# Patient Record
Sex: FEMALE | Race: BLACK OR AFRICAN AMERICAN | Employment: UNEMPLOYED | URBAN - METROPOLITAN AREA
[De-identification: names, ages, dates, MRNs, and addresses within clinical notes are randomized per-mention and may not be internally consistent; named-entity substitution may affect disease eponyms.]

---

## 2018-01-19 ENCOUNTER — HOSPITAL ENCOUNTER (EMERGENCY)
Age: 63
Discharge: HOME | End: 2018-01-19
Attending: EMERGENCY MEDICINE
Payer: SELF-PAY

## 2018-01-19 VITALS
BODY MASS INDEX: 15.36 KG/M2 | WEIGHT: 90 LBS | RESPIRATION RATE: 15 BRPM | SYSTOLIC BLOOD PRESSURE: 104 MMHG | HEIGHT: 64 IN | TEMPERATURE: 97.8 F | DIASTOLIC BLOOD PRESSURE: 43 MMHG | HEART RATE: 79 BPM | OXYGEN SATURATION: 100 %

## 2018-01-19 DIAGNOSIS — M62.838 MUSCLE SPASM: Primary | ICD-10-CM

## 2018-01-19 LAB
ALBUMIN SERPL-MCNC: 4.2 G/DL (ref 3.4–5)
ALBUMIN/GLOB SERPL: 1 {RATIO} (ref 0.8–1.7)
ALP SERPL-CCNC: 80 U/L (ref 45–117)
ALT SERPL-CCNC: 18 U/L (ref 13–56)
ANION GAP SERPL CALC-SCNC: 13 MMOL/L (ref 3–18)
AST SERPL-CCNC: 20 U/L (ref 15–37)
BASOPHILS # BLD: 0 K/UL (ref 0–0.06)
BASOPHILS NFR BLD: 0 % (ref 0–2)
BILIRUB SERPL-MCNC: 1.1 MG/DL (ref 0.2–1)
BUN SERPL-MCNC: 31 MG/DL (ref 7–18)
BUN/CREAT SERPL: 20 (ref 12–20)
CALCIUM SERPL-MCNC: 10 MG/DL (ref 8.5–10.1)
CHLORIDE SERPL-SCNC: 95 MMOL/L (ref 100–108)
CK SERPL-CCNC: 41 U/L (ref 26–192)
CO2 SERPL-SCNC: 29 MMOL/L (ref 21–32)
CREAT SERPL-MCNC: 1.58 MG/DL (ref 0.6–1.3)
DIFFERENTIAL METHOD BLD: ABNORMAL
EOSINOPHIL # BLD: 0 K/UL (ref 0–0.4)
EOSINOPHIL NFR BLD: 0 % (ref 0–5)
ERYTHROCYTE [DISTWIDTH] IN BLOOD BY AUTOMATED COUNT: 14.8 % (ref 11.6–14.5)
GLOBULIN SER CALC-MCNC: 4.4 G/DL (ref 2–4)
GLUCOSE SERPL-MCNC: 138 MG/DL (ref 74–99)
HCT VFR BLD AUTO: 40.4 % (ref 35–45)
HGB BLD-MCNC: 12.8 G/DL (ref 12–16)
LYMPHOCYTES # BLD: 1.7 K/UL (ref 0.9–3.6)
LYMPHOCYTES NFR BLD: 30 % (ref 21–52)
MAGNESIUM SERPL-MCNC: 2.5 MG/DL (ref 1.6–2.6)
MCH RBC QN AUTO: 25 PG (ref 24–34)
MCHC RBC AUTO-ENTMCNC: 31.7 G/DL (ref 31–37)
MCV RBC AUTO: 78.9 FL (ref 74–97)
MONOCYTES # BLD: 0.4 K/UL (ref 0.05–1.2)
MONOCYTES NFR BLD: 7 % (ref 3–10)
NEUTS SEG # BLD: 3.5 K/UL (ref 1.8–8)
NEUTS SEG NFR BLD: 63 % (ref 40–73)
PLATELET # BLD AUTO: 249 K/UL (ref 135–420)
PMV BLD AUTO: 10.6 FL (ref 9.2–11.8)
POTASSIUM SERPL-SCNC: 4.6 MMOL/L (ref 3.5–5.5)
PROT SERPL-MCNC: 8.6 G/DL (ref 6.4–8.2)
RBC # BLD AUTO: 5.12 M/UL (ref 4.2–5.3)
SODIUM SERPL-SCNC: 137 MMOL/L (ref 136–145)
WBC # BLD AUTO: 5.6 K/UL (ref 4.6–13.2)

## 2018-01-19 PROCEDURE — 96360 HYDRATION IV INFUSION INIT: CPT

## 2018-01-19 PROCEDURE — 83735 ASSAY OF MAGNESIUM: CPT | Performed by: EMERGENCY MEDICINE

## 2018-01-19 PROCEDURE — 85025 COMPLETE CBC W/AUTO DIFF WBC: CPT | Performed by: EMERGENCY MEDICINE

## 2018-01-19 PROCEDURE — 99284 EMERGENCY DEPT VISIT MOD MDM: CPT

## 2018-01-19 PROCEDURE — 74011250637 HC RX REV CODE- 250/637: Performed by: EMERGENCY MEDICINE

## 2018-01-19 PROCEDURE — 80053 COMPREHEN METABOLIC PANEL: CPT | Performed by: EMERGENCY MEDICINE

## 2018-01-19 PROCEDURE — 74011250636 HC RX REV CODE- 250/636: Performed by: EMERGENCY MEDICINE

## 2018-01-19 PROCEDURE — 82550 ASSAY OF CK (CPK): CPT | Performed by: EMERGENCY MEDICINE

## 2018-01-19 RX ORDER — OXYCODONE HYDROCHLORIDE 10 MG/1
TABLET ORAL
COMMUNITY
End: 2019-11-19

## 2018-01-19 RX ORDER — DIAZEPAM 5 MG/1
2.5 TABLET ORAL ONCE
Status: COMPLETED | OUTPATIENT
Start: 2018-01-19 | End: 2018-01-19

## 2018-01-19 RX ORDER — DIAZEPAM 2 MG/1
2 TABLET ORAL
Qty: 20 TAB | Refills: 0 | Status: SHIPPED | OUTPATIENT
Start: 2018-01-19 | End: 2019-11-19

## 2018-01-19 RX ADMIN — SODIUM CHLORIDE 1000 ML: 900 INJECTION, SOLUTION INTRAVENOUS at 13:31

## 2018-01-19 RX ADMIN — DIAZEPAM 2.5 MG: 5 TABLET ORAL at 12:22

## 2018-01-19 NOTE — DISCHARGE INSTRUCTIONS
Muscle Cramps: Care Instructions  Your Care Instructions    A muscle cramp occurs when a muscle tightens up suddenly. A cramp often happens in the legs. A muscle cramp is also called a muscle spasm or a charley horse. Muscle cramps usually last less than a minute. However, the pain may last for several minutes. Leg cramps that occur at night may wake you up. Heavy exercise, dehydration, and being overweight can increase your risk of getting cramps. An imbalance of certain chemicals in your blood, called electrolytes, can also lead to muscle cramps. Pregnant women sometimes get muscle cramps during sleep. Muscle cramps can be treated by stretching and massaging the muscle. If cramps keep coming back, your doctor may prescribe medicine that relaxes your muscles. Follow-up care is a key part of your treatment and safety. Be sure to make and go to all appointments, and call your doctor if you are having problems. It's also a good idea to know your test results and keep a list of the medicines you take. How can you care for yourself at home? · Drink plenty of fluids to prevent dehydration. Choose water and other caffeine-free clear liquids until you feel better. If you have kidney, heart, or liver disease and have to limit fluids, talk with your doctor before you increase the amount of fluids you drink. · Stretch your muscles every day, especially before and after exercise and at bedtime. Regular stretching can relax your muscles and may prevent cramps. · Do not suddenly increase the amount of exercise you get. Increase your exercise a little each week. · When you get a cramp, stretch and massage the muscle. You can also take a warm shower or bath to relax the muscle. A heating pad placed on the muscle can also help. · Take a daily multivitamin supplement. · Ask your doctor if you can take an over-the-counter pain medicine, such as acetaminophen (Tylenol), ibuprofen (Advil, Motrin), or naproxen (Aleve). Be safe with medicines. Read and follow all instructions on the label. When should you call for help? Watch closely for changes in your health, and be sure to contact your doctor if:  ? · You get muscle cramps often that do not go away after home treatment. ? · Your muscle cramps often wake you up at night. ? · You do not get better as expected. Where can you learn more? Go to http://shirley-jai.info/. Enter H270 in the search box to learn more about \"Muscle Cramps: Care Instructions. \"  Current as of: March 21, 2017  Content Version: 11.4  © 3704-6167 [employer]. Care instructions adapted under license by "[employer]" (which disclaims liability or warranty for this information). If you have questions about a medical condition or this instruction, always ask your healthcare professional. [address] any warranty or liability for your use of this information.

## 2018-01-19 NOTE — ED TRIAGE NOTES
Pt started first week of chemotherapy tues wed thurs at [facility] for colon cancer;  Started having cramping in lower legs, thighs, hands 2 days ago, today is worse;  Pt denies nausea, vomiting, diarrhea, fever;

## 2018-01-19 NOTE — ED PROVIDER NOTES
EMERGENCY DEPARTMENT HISTORY AND PHYSICAL EXAM    Date: 1/19/2018  Patient Name: Sofy Eli    History of Presenting Illness     Chief Complaint   Patient presents with    Spasms         History Provided By: Patient    Chief Complaint: Tremors/Muscle cramping  Duration: 3-4 Hours  Timing:  Acute  Location: General body  Quality: Cramping  Severity: 4 out of 10  Associated Symptoms: nausea, decreased appetite, and weight loss    Additional History (Context):   12:10 PM  Sofy Eli is a 63 y.o. female with PMHx of colon cancer who presents to the emergency department C/O general body tremors/muscle cramping (4/10) onset 3-4 hours ago. Associated sxs include nausea, decreased appetite, and weight loss. Reports she is in chemotherapy and taking pain medications. Her last chemotherapy treatment was yesterday for colon cancer. Pt's oncologist is Dr. Jordon Tejada. Yenny Garcia MD at [facility]. Denies any new medications. Pt denies vomiting, diarrhea, constipation, fever, abdominal pain, chills, and any other sxs or complaints. PCP: None        Past History     Past Medical History:  Past Medical History:   Diagnosis Date    Cancer [facility])     colon cancer       Past Surgical History:  History reviewed. No pertinent surgical history. Family History:  History reviewed. No pertinent family history. Social History:  Social History   Substance Use Topics    Smoking status: None    Smokeless tobacco: None    Alcohol use None       Allergies:  No Known Allergies      Review of Systems   Review of Systems   Constitutional: Negative for fever. Gastrointestinal: Positive for nausea. Negative for abdominal pain, constipation, diarrhea and vomiting. Musculoskeletal: Positive for myalgias (general cramping). Neurological: Positive for tremors. All other systems reviewed and are negative.       Physical Exam     Vitals:    01/19/18 1300 01/19/18 1315 01/19/18 1330 01/19/18 1345   BP: 104/65 109/61 111/56 104/43   Pulse: 76 73 80 79   Resp: 12 14 20 15   Temp:       SpO2: 100% 98% 96% 100%   Weight:       Height:         Physical Exam   Nursing note and vitals reviewed. Constitutional: Chronically ill-appearing, no acute distress, cachectic, diffuse intermittent body spasms  Head: Normocephalic, Atraumatic  Eyes: Pupils are equal, round, and reactive to light, EOMI  Neck: Supple  Cardiovascular: Regular rate and rhythm, no murmurs, rubs, or gallops  Chest: Normal work of breathing and chest excursion bilaterally  Lungs: Clear to ausculation bilaterally  Abdomen: Soft, non tender, non distended, normoactive bowel sounds  Back: No evidence of trauma or deformity  Extremities: No evidence of trauma or deformity, no LE edema  Skin: Warm and dry, normal cap refill  Neuro: Alert and appropriate, CN intact, normal speech, strength and sensation full and symmetric bilaterally, normal gait, normal coordination  Psychiatric: Normal mood and affect    Diagnostic Study Results     Labs -     Recent Results (from the past 12 hour(s))   CBC WITH AUTOMATED DIFF    Collection Time: 01/19/18 12:45 PM   Result Value Ref Range    WBC 5.6 4.6 - 13.2 K/uL    RBC 5.12 4.20 - 5.30 M/uL    HGB 12.8 12.0 - 16.0 g/dL    HCT 40.4 35.0 - 45.0 %    MCV 78.9 74.0 - 97.0 FL    MCH 25.0 24.0 - 34.0 PG    MCHC 31.7 31.0 - 37.0 g/dL    RDW 14.8 (H) 11.6 - 14.5 %    PLATELET 965 629 - 346 K/uL    MPV 10.6 9.2 - 11.8 FL    NEUTROPHILS 63 40 - 73 %    LYMPHOCYTES 30 21 - 52 %    MONOCYTES 7 3 - 10 %    EOSINOPHILS 0 0 - 5 %    BASOPHILS 0 0 - 2 %    ABS. NEUTROPHILS 3.5 1.8 - 8.0 K/UL    ABS. LYMPHOCYTES 1.7 0.9 - 3.6 K/UL    ABS. MONOCYTES 0.4 0.05 - 1.2 K/UL    ABS. EOSINOPHILS 0.0 0.0 - 0.4 K/UL    ABS.  BASOPHILS 0.0 0.0 - 0.06 K/UL    DF AUTOMATED     METABOLIC PANEL, COMPREHENSIVE    Collection Time: 01/19/18 12:45 PM   Result Value Ref Range    Sodium 137 136 - 145 mmol/L    Potassium 4.6 3.5 - 5.5 mmol/L    Chloride 95 (L) 100 - 108 mmol/L    CO2 29 21 - 32 mmol/L    Anion gap 13 3.0 - 18 mmol/L    Glucose 138 (H) 74 - 99 mg/dL    BUN 31 (H) 7.0 - 18 MG/DL    Creatinine 1.58 (H) 0.6 - 1.3 MG/DL    BUN/Creatinine ratio 20 12 - 20      GFR est AA 40 (L) >60 ml/min/1.73m2    GFR est non-AA 33 (L) >60 ml/min/1.73m2    Calcium 10.0 8.5 - 10.1 MG/DL    Bilirubin, total 1.1 (H) 0.2 - 1.0 MG/DL    ALT (SGPT) 18 13 - 56 U/L    AST (SGOT) 20 15 - 37 U/L    Alk. phosphatase 80 45 - 117 U/L    Protein, total 8.6 (H) 6.4 - 8.2 g/dL    Albumin 4.2 3.4 - 5.0 g/dL    Globulin 4.4 (H) 2.0 - 4.0 g/dL    A-G Ratio 1.0 0.8 - 1.7     MAGNESIUM    Collection Time: 01/19/18 12:45 PM   Result Value Ref Range    Magnesium 2.5 1.6 - 2.6 mg/dL   CK    Collection Time: 01/19/18 12:45 PM   Result Value Ref Range    CK 41 26 - 192 U/L       Radiologic Studies -   No orders to display     CT Results  (Last 48 hours)    None        CXR Results  (Last 48 hours)    None          Medications given in the ED-  Medications   diazePAM (VALIUM) tablet 2.5 mg (2.5 mg Oral Given 1/19/18 1222)   sodium chloride 0.9 % bolus infusion 1,000 mL (0 mL IntraVENous IV Completed 1/19/18 1727)         Medical Decision Making   I am the first provider for this patient. I reviewed the vital signs, available nursing notes, past medical history, past surgical history, family history and social history. Vital Signs-Reviewed the patient's vital signs. Pulse Oximetry Analysis - 100% on RA. Cardiac Monitor:  Rate: 91 bpm  Rhythm: NSR    Records Reviewed: Nursing Notes and Old Medical Records    Procedures:  Procedures    ED Course:   12:10 PM Initial assessment performed. The patients presenting problems have been discussed, and they are in agreement with the care plan formulated and outlined with them. I have encouraged them to ask questions as they arise throughout their visit.     PROGRESS NOTE:   1:31 PM  Pt has been re-examined by Gogo Colón MD. Pt reports her symptoms have completely resolved. CONSULT NOTE:   2:43 PM  Gloria Yoo MD spoke with Jo Richey's Nurse   Specialty: Oncology  Discussed pt's hx, disposition, and available diagnostic and imaging results over the telephone. Reviewed care plans. Consulting physician agrees with starting Valium and will see patient next week in his office. Diagnosis and Disposition     Discussion:  63 y.o female presenting for diffuse muscle spasms. Other than slight dehydration, labs are benign. Improved here with small dose of Valium. Provided with IV hydration. Discussed with pt's oncologist. Plan for discharge with short course of Valium for symptom management, early oncology follow up and return precautions. Pt understands and agrees with this plan. DISCHARGE NOTE:  2:45 PM  Daya Parks's  results have been reviewed with her. She has been counseled regarding her diagnosis, treatment, and plan. She verbally conveys understanding and agreement of the signs, symptoms, diagnosis, treatment and prognosis and additionally agrees to follow up as discussed. She also agrees with the care-plan and conveys that all of her questions have been answered. I have also provided discharge instructions for her that include: educational information regarding their diagnosis and treatment, and list of reasons why they would want to return to the ED prior to their follow-up appointment, should her condition change. She has been provided with education for proper emergency department utilization. CLINICAL IMPRESSION:    1. Muscle spasm        PLAN:  1. D/C Home  2. Current Discharge Medication List      START taking these medications    Details   diazePAM (VALIUM) 2 mg tablet Take 1 Tab by mouth every six (6) hours as needed (muscle spasm). Max Daily Amount: 8 mg. Qty: 20 Tab, Refills: 0    Associated Diagnoses: Muscle spasm           3.    Follow-up Information     Follow up With Details Comments 0792 Raúl Rasmussen MD Schedule an appointment as soon as possible for a visit in 3 days For primary care follow up. [id number] [address] [address]  [phone]      [facility] EMERGENCY DEPT Go to As needed, If symptoms worsen [address] [address]  [phone]        _______________________________    Attestations: This note is prepared by Nabil Scott, acting as Scribe for Deidre Rinaldi MD.    Deidre Rinaldi MD:  The scribe's documentation has been prepared under my direction and personally reviewed by me in its entirety.   I confirm that the note above accurately reflects all work, treatment, procedures, and medical decision making performed by me.  _______________________________

## 2019-11-16 ENCOUNTER — HOSPITAL ENCOUNTER (INPATIENT)
Age: 64
LOS: 3 days | Discharge: HOSPICE | DRG: 240 | End: 2019-11-19
Attending: EMERGENCY MEDICINE | Admitting: FAMILY MEDICINE
Payer: MEDICAID

## 2019-11-16 ENCOUNTER — APPOINTMENT (OUTPATIENT)
Dept: CT IMAGING | Age: 64
DRG: 240 | End: 2019-11-16
Attending: EMERGENCY MEDICINE
Payer: MEDICAID

## 2019-11-16 DIAGNOSIS — Z85.038 H/O COLON CANCER, STAGE IV: Primary | ICD-10-CM

## 2019-11-16 DIAGNOSIS — D50.0 IRON DEFICIENCY ANEMIA DUE TO CHRONIC BLOOD LOSS: ICD-10-CM

## 2019-11-16 DIAGNOSIS — C18.9 MALIGNANT NEOPLASM OF COLON, UNSPECIFIED PART OF COLON (HCC): ICD-10-CM

## 2019-11-16 DIAGNOSIS — R53.81 DEBILITY: ICD-10-CM

## 2019-11-16 DIAGNOSIS — K62.89 RECTAL PAIN: ICD-10-CM

## 2019-11-16 DIAGNOSIS — R53.1 WEAKNESS: ICD-10-CM

## 2019-11-16 DIAGNOSIS — C18.2 MALIGNANT NEOPLASM OF ASCENDING COLON (HCC): ICD-10-CM

## 2019-11-16 DIAGNOSIS — Z71.89 ADVANCED CARE PLANNING/COUNSELING DISCUSSION: ICD-10-CM

## 2019-11-16 LAB
ABO + RH BLD: NORMAL
ALBUMIN SERPL-MCNC: 2.7 G/DL (ref 3.4–5)
ALBUMIN/GLOB SERPL: 0.6 {RATIO} (ref 0.8–1.7)
ALP SERPL-CCNC: 237 U/L (ref 45–117)
ALT SERPL-CCNC: 14 U/L (ref 13–56)
ANION GAP SERPL CALC-SCNC: 10 MMOL/L (ref 3–18)
APPEARANCE UR: CLEAR
AST SERPL-CCNC: 150 U/L (ref 10–38)
BACTERIA URNS QL MICRO: ABNORMAL /HPF
BASOPHILS # BLD: 0 K/UL (ref 0–0.1)
BASOPHILS NFR BLD: 0 % (ref 0–2)
BILIRUB SERPL-MCNC: 0.5 MG/DL (ref 0.2–1)
BILIRUB UR QL: NEGATIVE
BLOOD GROUP ANTIBODIES SERPL: NORMAL
BUN SERPL-MCNC: 13 MG/DL (ref 7–18)
BUN/CREAT SERPL: 13 (ref 12–20)
CALCIUM SERPL-MCNC: 8.3 MG/DL (ref 8.5–10.1)
CHLORIDE SERPL-SCNC: 97 MMOL/L (ref 100–111)
CK MB CFR SERPL CALC: NORMAL % (ref 0–4)
CK MB SERPL-MCNC: <1 NG/ML (ref 5–25)
CK SERPL-CCNC: 174 U/L (ref 26–192)
CO2 SERPL-SCNC: 28 MMOL/L (ref 21–32)
COLOR UR: YELLOW
CREAT SERPL-MCNC: 1.03 MG/DL (ref 0.6–1.3)
DIFFERENTIAL METHOD BLD: ABNORMAL
EOSINOPHIL # BLD: 0 K/UL (ref 0–0.4)
EOSINOPHIL NFR BLD: 0 % (ref 0–5)
EPITH CASTS URNS QL MICRO: ABNORMAL /LPF (ref 0–5)
ERYTHROCYTE [DISTWIDTH] IN BLOOD BY AUTOMATED COUNT: 19.1 % (ref 11.6–14.5)
GLOBULIN SER CALC-MCNC: 4.6 G/DL (ref 2–4)
GLUCOSE SERPL-MCNC: 94 MG/DL (ref 74–99)
GLUCOSE UR STRIP.AUTO-MCNC: NEGATIVE MG/DL
HCT VFR BLD AUTO: 29.8 % (ref 35–45)
HGB BLD-MCNC: 9.2 G/DL (ref 12–16)
HGB UR QL STRIP: ABNORMAL
KETONES UR QL STRIP.AUTO: NEGATIVE MG/DL
LEUKOCYTE ESTERASE UR QL STRIP.AUTO: NEGATIVE
LIPASE SERPL-CCNC: 156 U/L (ref 73–393)
LYMPHOCYTES # BLD: 0.6 K/UL (ref 0.9–3.6)
LYMPHOCYTES NFR BLD: 5 % (ref 21–52)
MAGNESIUM SERPL-MCNC: 1.9 MG/DL (ref 1.6–2.6)
MCH RBC QN AUTO: 24.5 PG (ref 24–34)
MCHC RBC AUTO-ENTMCNC: 30.9 G/DL (ref 31–37)
MCV RBC AUTO: 79.3 FL (ref 74–97)
MONOCYTES # BLD: 1.3 K/UL (ref 0.05–1.2)
MONOCYTES NFR BLD: 11 % (ref 3–10)
NEUTS SEG # BLD: 9.7 K/UL (ref 1.8–8)
NEUTS SEG NFR BLD: 84 % (ref 40–73)
NITRITE UR QL STRIP.AUTO: NEGATIVE
PH UR STRIP: 6 [PH] (ref 5–8)
PLATELET # BLD AUTO: 244 K/UL (ref 135–420)
PMV BLD AUTO: 9.6 FL (ref 9.2–11.8)
POTASSIUM SERPL-SCNC: 3.7 MMOL/L (ref 3.5–5.5)
PROT SERPL-MCNC: 7.3 G/DL (ref 6.4–8.2)
PROT UR STRIP-MCNC: NEGATIVE MG/DL
RBC # BLD AUTO: 3.76 M/UL (ref 4.2–5.3)
RBC #/AREA URNS HPF: ABNORMAL /HPF (ref 0–5)
RBC MORPH BLD: ABNORMAL
SODIUM SERPL-SCNC: 135 MMOL/L (ref 136–145)
SP GR UR REFRACTOMETRY: 1.01 (ref 1–1.03)
SPECIMEN EXP DATE BLD: NORMAL
TROPONIN I SERPL-MCNC: <0.02 NG/ML (ref 0–0.04)
UROBILINOGEN UR QL STRIP.AUTO: 1 EU/DL (ref 0.2–1)
WBC # BLD AUTO: 11.6 K/UL (ref 4.6–13.2)
WBC URNS QL MICRO: ABNORMAL /HPF (ref 0–5)

## 2019-11-16 PROCEDURE — 74177 CT ABD & PELVIS W/CONTRAST: CPT

## 2019-11-16 PROCEDURE — 74011250636 HC RX REV CODE- 250/636: Performed by: FAMILY MEDICINE

## 2019-11-16 PROCEDURE — 86900 BLOOD TYPING SEROLOGIC ABO: CPT

## 2019-11-16 PROCEDURE — 83735 ASSAY OF MAGNESIUM: CPT

## 2019-11-16 PROCEDURE — 96374 THER/PROPH/DIAG INJ IV PUSH: CPT

## 2019-11-16 PROCEDURE — 65270000029 HC RM PRIVATE

## 2019-11-16 PROCEDURE — 85025 COMPLETE CBC W/AUTO DIFF WBC: CPT

## 2019-11-16 PROCEDURE — 74011636320 HC RX REV CODE- 636/320: Performed by: EMERGENCY MEDICINE

## 2019-11-16 PROCEDURE — 81001 URINALYSIS AUTO W/SCOPE: CPT

## 2019-11-16 PROCEDURE — 87086 URINE CULTURE/COLONY COUNT: CPT

## 2019-11-16 PROCEDURE — 82550 ASSAY OF CK (CPK): CPT

## 2019-11-16 PROCEDURE — 80053 COMPREHEN METABOLIC PANEL: CPT

## 2019-11-16 PROCEDURE — 96376 TX/PRO/DX INJ SAME DRUG ADON: CPT

## 2019-11-16 PROCEDURE — 83690 ASSAY OF LIPASE: CPT

## 2019-11-16 PROCEDURE — 74011250636 HC RX REV CODE- 250/636: Performed by: EMERGENCY MEDICINE

## 2019-11-16 PROCEDURE — 96375 TX/PRO/DX INJ NEW DRUG ADDON: CPT

## 2019-11-16 PROCEDURE — 65660000000 HC RM CCU STEPDOWN

## 2019-11-16 PROCEDURE — 99285 EMERGENCY DEPT VISIT HI MDM: CPT

## 2019-11-16 RX ORDER — SODIUM CHLORIDE 9 MG/ML
150 INJECTION, SOLUTION INTRAVENOUS CONTINUOUS
Status: DISCONTINUED | OUTPATIENT
Start: 2019-11-16 | End: 2019-11-16

## 2019-11-16 RX ORDER — LORAZEPAM 1 MG/1
TABLET ORAL
COMMUNITY
Start: 2019-11-01 | End: 2019-11-19

## 2019-11-16 RX ORDER — GABAPENTIN 300 MG/1
300 CAPSULE ORAL
COMMUNITY
Start: 2019-10-16

## 2019-11-16 RX ORDER — ONDANSETRON 2 MG/ML
4 INJECTION INTRAMUSCULAR; INTRAVENOUS
Status: COMPLETED | OUTPATIENT
Start: 2019-11-16 | End: 2019-11-16

## 2019-11-16 RX ORDER — ONDANSETRON 2 MG/ML
4 INJECTION INTRAMUSCULAR; INTRAVENOUS
Status: DISCONTINUED | OUTPATIENT
Start: 2019-11-16 | End: 2019-11-19 | Stop reason: HOSPADM

## 2019-11-16 RX ORDER — OXYCODONE HYDROCHLORIDE 30 MG/1
30 TABLET ORAL
COMMUNITY
Start: 2019-11-05 | End: 2019-11-19

## 2019-11-16 RX ORDER — OXYCODONE HYDROCHLORIDE 5 MG/1
5 TABLET ORAL
Status: DISCONTINUED | OUTPATIENT
Start: 2019-11-16 | End: 2019-11-19 | Stop reason: HOSPADM

## 2019-11-16 RX ORDER — SCOLOPAMINE TRANSDERMAL SYSTEM 1 MG/1
1 PATCH, EXTENDED RELEASE TRANSDERMAL
Status: DISCONTINUED | OUTPATIENT
Start: 2019-11-16 | End: 2019-11-17

## 2019-11-16 RX ORDER — MORPHINE SULFATE 2 MG/ML
2 INJECTION, SOLUTION INTRAMUSCULAR; INTRAVENOUS
Status: DISCONTINUED | OUTPATIENT
Start: 2019-11-16 | End: 2019-11-19 | Stop reason: HOSPADM

## 2019-11-16 RX ORDER — FENTANYL CITRATE 50 UG/ML
50 INJECTION, SOLUTION INTRAMUSCULAR; INTRAVENOUS ONCE
Status: COMPLETED | OUTPATIENT
Start: 2019-11-16 | End: 2019-11-16

## 2019-11-16 RX ORDER — FENTANYL CITRATE 50 UG/ML
25 INJECTION, SOLUTION INTRAMUSCULAR; INTRAVENOUS ONCE
Status: COMPLETED | OUTPATIENT
Start: 2019-11-16 | End: 2019-11-16

## 2019-11-16 RX ADMIN — SODIUM CHLORIDE 500 ML: 900 INJECTION, SOLUTION INTRAVENOUS at 22:55

## 2019-11-16 RX ADMIN — MORPHINE SULFATE 2 MG: 2 INJECTION, SOLUTION INTRAMUSCULAR; INTRAVENOUS at 23:34

## 2019-11-16 RX ADMIN — ONDANSETRON 4 MG: 2 INJECTION INTRAMUSCULAR; INTRAVENOUS at 20:48

## 2019-11-16 RX ADMIN — IOPAMIDOL 70 ML: 612 INJECTION, SOLUTION INTRAVENOUS at 21:03

## 2019-11-16 RX ADMIN — FENTANYL CITRATE 25 MCG: 50 INJECTION, SOLUTION INTRAMUSCULAR; INTRAVENOUS at 20:48

## 2019-11-16 RX ADMIN — FENTANYL CITRATE 50 MCG: 50 INJECTION, SOLUTION INTRAMUSCULAR; INTRAVENOUS at 22:55

## 2019-11-17 PROBLEM — R53.1 WEAKNESS: Status: ACTIVE | Noted: 2019-11-17

## 2019-11-17 PROBLEM — Z51.5 HOSPICE CARE: Status: ACTIVE | Noted: 2019-11-17

## 2019-11-17 PROCEDURE — 74011250637 HC RX REV CODE- 250/637: Performed by: INTERNAL MEDICINE

## 2019-11-17 PROCEDURE — 65660000000 HC RM CCU STEPDOWN

## 2019-11-17 PROCEDURE — 74011250636 HC RX REV CODE- 250/636: Performed by: FAMILY MEDICINE

## 2019-11-17 PROCEDURE — 74011250637 HC RX REV CODE- 250/637: Performed by: FAMILY MEDICINE

## 2019-11-17 RX ORDER — POLYETHYLENE GLYCOL 3350 17 G/17G
17 POWDER, FOR SOLUTION ORAL DAILY
Status: DISCONTINUED | OUTPATIENT
Start: 2019-11-17 | End: 2019-11-19 | Stop reason: HOSPADM

## 2019-11-17 RX ORDER — SCOLOPAMINE TRANSDERMAL SYSTEM 1 MG/1
1 PATCH, EXTENDED RELEASE TRANSDERMAL
Status: DISCONTINUED | OUTPATIENT
Start: 2019-11-17 | End: 2019-11-19 | Stop reason: HOSPADM

## 2019-11-17 RX ORDER — OXYCODONE HCL 10 MG/1
10 TABLET, FILM COATED, EXTENDED RELEASE ORAL EVERY 12 HOURS
Status: DISCONTINUED | OUTPATIENT
Start: 2019-11-17 | End: 2019-11-19 | Stop reason: HOSPADM

## 2019-11-17 RX ORDER — LORAZEPAM 2 MG/ML
1 INJECTION INTRAMUSCULAR
Status: DISCONTINUED | OUTPATIENT
Start: 2019-11-17 | End: 2019-11-19 | Stop reason: HOSPADM

## 2019-11-17 RX ORDER — LORAZEPAM 2 MG/ML
0.5 CONCENTRATE ORAL
Status: DISCONTINUED | OUTPATIENT
Start: 2019-11-17 | End: 2019-11-19 | Stop reason: HOSPADM

## 2019-11-17 RX ORDER — SCOLOPAMINE TRANSDERMAL SYSTEM 1 MG/1
1 PATCH, EXTENDED RELEASE TRANSDERMAL
Qty: 1 PATCH | Refills: 0 | Status: SHIPPED | OUTPATIENT
Start: 2019-11-20

## 2019-11-17 RX ORDER — DOCUSATE SODIUM 100 MG/1
100 CAPSULE, LIQUID FILLED ORAL 2 TIMES DAILY
Status: DISCONTINUED | OUTPATIENT
Start: 2019-11-17 | End: 2019-11-19 | Stop reason: HOSPADM

## 2019-11-17 RX ORDER — ACETAMINOPHEN 650 MG/1
650 SUPPOSITORY RECTAL
Status: DISCONTINUED | OUTPATIENT
Start: 2019-11-17 | End: 2019-11-19 | Stop reason: HOSPADM

## 2019-11-17 RX ORDER — OXYCODONE HCL 10 MG/1
10 TABLET, FILM COATED, EXTENDED RELEASE ORAL EVERY 12 HOURS
Qty: 40 TAB | Refills: 0 | Status: SHIPPED | OUTPATIENT
Start: 2019-11-17 | End: 2019-11-19 | Stop reason: SDUPTHER

## 2019-11-17 RX ORDER — ACETAMINOPHEN 325 MG/1
650 TABLET ORAL
Status: DISCONTINUED | OUTPATIENT
Start: 2019-11-17 | End: 2019-11-19 | Stop reason: HOSPADM

## 2019-11-17 RX ORDER — OXYCODONE HYDROCHLORIDE 5 MG/1
5 TABLET ORAL
Qty: 40 TAB | Refills: 0 | Status: SHIPPED | OUTPATIENT
Start: 2019-11-17 | End: 2019-11-19 | Stop reason: SDUPTHER

## 2019-11-17 RX ORDER — LORAZEPAM 2 MG/ML
0.5 CONCENTRATE ORAL
Qty: 30 ML | Refills: 0 | Status: SHIPPED | OUTPATIENT
Start: 2019-11-17 | End: 2019-11-19 | Stop reason: SDUPTHER

## 2019-11-17 RX ORDER — MORPHINE SULFATE 100 MG/5ML
10 SOLUTION ORAL
Status: DISCONTINUED | OUTPATIENT
Start: 2019-11-17 | End: 2019-11-19 | Stop reason: HOSPADM

## 2019-11-17 RX ORDER — MORPHINE SULFATE 20 MG/ML
10 SOLUTION ORAL
Qty: 30 ML | Refills: 0 | Status: SHIPPED | OUTPATIENT
Start: 2019-11-17 | End: 2019-11-20

## 2019-11-17 RX ADMIN — DOCUSATE SODIUM 100 MG: 100 CAPSULE, LIQUID FILLED ORAL at 23:06

## 2019-11-17 RX ADMIN — OXYCODONE HYDROCHLORIDE 10 MG: 10 TABLET, FILM COATED, EXTENDED RELEASE ORAL at 08:30

## 2019-11-17 RX ADMIN — MORPHINE SULFATE 2 MG: 2 INJECTION, SOLUTION INTRAMUSCULAR; INTRAVENOUS at 03:46

## 2019-11-17 RX ADMIN — MORPHINE SULFATE 2 MG: 2 INJECTION, SOLUTION INTRAMUSCULAR; INTRAVENOUS at 12:57

## 2019-11-17 RX ADMIN — MORPHINE SULFATE 2 MG: 2 INJECTION, SOLUTION INTRAMUSCULAR; INTRAVENOUS at 17:11

## 2019-11-17 RX ADMIN — MORPHINE SULFATE 2 MG: 2 INJECTION, SOLUTION INTRAMUSCULAR; INTRAVENOUS at 06:28

## 2019-11-17 RX ADMIN — MORPHINE SULFATE 2 MG: 2 INJECTION, SOLUTION INTRAMUSCULAR; INTRAVENOUS at 10:32

## 2019-11-17 RX ADMIN — MORPHINE SULFATE 2 MG: 2 INJECTION, SOLUTION INTRAMUSCULAR; INTRAVENOUS at 08:23

## 2019-11-17 RX ADMIN — DOCUSATE SODIUM 100 MG: 100 CAPSULE, LIQUID FILLED ORAL at 08:23

## 2019-11-17 RX ADMIN — MORPHINE SULFATE 2 MG: 2 INJECTION, SOLUTION INTRAMUSCULAR; INTRAVENOUS at 01:40

## 2019-11-17 RX ADMIN — OXYCODONE HYDROCHLORIDE 10 MG: 10 TABLET, FILM COATED, EXTENDED RELEASE ORAL at 23:06

## 2019-11-17 NOTE — PROGRESS NOTES
Physical Therapy Screening:  Services are not indicated at this time. An InBasket screening referral was triggered for physical therapy based on results obtained during the nursing admission assessment. The patients chart was reviewed and the patient is not appropriate for a skilled therapy evaluation at this time. Please consult physical therapy if any therapy needs arise. Thank you.     Mahogany Oden PTA

## 2019-11-17 NOTE — PROGRESS NOTES
Hospitalist Progress Note    Patient: Mayra Ruelas MRN: [id number]  CSN: [id number]    YOB: 1955  Age: 60 y.o. Sex: female    DOA: 11/16/2019 LOS:  LOS: 1 day            Assessment/Plan   1. Rectal cancer    Plan:  - awaiting DC w hospice, unable to DC over weekend as it is unclear if her insurance has hospice benefit and needs 24 hour care. Patient Active Problem List   Diagnosis Code    Colon cancer ([facility] [address] C18.9    Hospice care Z51.5    Weakness R53.1               Subjective:    cc: pain  No acute events overnight  Pain is currently controlled      REVIEW OF SYSTEMS:  General: No fevers or chills. Cardiovascular: No chest pain or pressure. No palpitations. Pulmonary: No shortness of breath. Gastrointestinal: No nausea, vomiting. Objective:        Vital signs/Intake and Output:  Visit Vitals  /77 (BP 1 Location: Left arm, BP Patient Position: At rest)   Pulse 77   Temp 97.8 °F (36.6 °C)   Resp 18   Ht 5' 4\" (1.626 m)   Wt 35.4 kg (78 lb)   SpO2 100%   BMI 13.39 kg/m²     Current Shift:  No intake/output data recorded. Last three shifts:  11/15 1901 - 11/17 0700  In: 150 [I.V.:150]  Out: -     Body mass index is 13.39 kg/m².     Physical Exam:  GEN: chronically ill appearing  EYES: conjunctiva normal, lids with out lesions  HEENT: MMM, No thyromegaly, no lymphadenopathy  HEART: RRR +S1 +S2, no JVD, pulses 2+ distally  LUNGS: CTA B/L no wheezes, rales or rhonchi  ABDOMEN: + BS, soft NT/ND no organomegaly,  No rebound  EXTREMITIES: No edema cyanosis, cap refill normal   SKIN: no rashes or skin breakdown, no nodules, normal turgor  Current Facility-Administered Medications   Medication Dose Route Frequency    scopolamine (TRANSDERM-SCOP) 1 mg over 3 days 1 Patch  1 Patch TransDERmal Q72H    LORazepam (ATIVAN) injection 1 mg  1 mg IntraVENous Q15MIN PRN    acetaminophen (TYLENOL) tablet 650 mg  650 mg Oral Q4H PRN    Or    acetaminophen (TYLENOL) solution 650 mg  650 mg Oral Q4H PRN    Or    acetaminophen (TYLENOL) suppository 650 mg  650 mg Rectal Q4H PRN    polyethylene glycol (MIRALAX) packet 17 g  17 g Oral DAILY    docusate sodium (COLACE) capsule 100 mg  100 mg Oral BID    oxyCODONE ER (OxyCONTIN) tablet 10 mg  10 mg Oral Q12H    morphine (ROXANOL) concentrated oral syringe 10 mg  10 mg Oral Q2H PRN    LORazepam (INTENSOL) 2 mg/mL oral concentrate 0.5 mg  0.5 mg Oral Q4H PRN    morphine injection 2 mg  2 mg IntraVENous Q2H PRN    oxyCODONE IR (ROXICODONE) tablet 5 mg  5 mg Oral Q4H PRN    ondansetron (ZOFRAN) injection 4 mg  4 mg IntraVENous Q6H PRN         All the patient's labs over the past 24 hours were reviewed both during my initial daily workflow process and at the time notated as \"note time\" in [employer]. (It is not time stamped separately in this workflow.)  Select labs are listed below.         Labs: Results:       Chemistry Recent Labs     11/16/19 2030   GLU 94   *   K 3.7   CL 97*   CO2 28   BUN 13   CREA 1.03   CA 8.3*   AGAP 10   BUCR 13   *   TP 7.3   ALB 2.7*   GLOB 4.6*   AGRAT 0.6*      CBC w/Diff Recent Labs     11/16/19 2030   WBC 11.6   RBC 3.76*   HGB 9.2*   HCT 29.8*      GRANS 84*   LYMPH 5*   EOS 0      Cardiac Enzymes Recent Labs     11/16/19 2030      CKND1 CALCULATION NOT PERFORMED WHEN RESULT IS BELOW LINEAR LIMIT                  Liver Enzymes Recent Labs     11/16/19 2030   TP 7.3   ALB 2.7*   *   SGOT 150*          Procedures/imaging: see electronic medical records for all procedures/Xrays and details which were not copied into this note but were reviewed prior to creation of AMANDA Mckeon 1, DO  Internal Medicine/Geriatrics

## 2019-11-17 NOTE — ROUTINE PROCESS
TRANSFER - OUT REPORT: 
 
Verbal report given to Carlos López RN.(name) on Ana Mckeon  being transferred to Medical (unit) for routine progression of care Report consisted of patients Situation, Background, Assessment and  
Recommendations(SBAR). Information from the following report(s) SBAR, ED Summary, Procedure Summary, Intake/Output, MAR and Recent Results was reviewed with the receiving nurse. Lines:  
Venous Access Device Upper chest (subclavicular area), left (Active) Central Line Being Utilized Yes 11/16/2019  8:40 PM  
Site Assessment Clean, dry, & intact 11/16/2019  9:10 PM  
Date of Last Dressing Change 11/16/19 11/16/2019  9:10 PM  
Dressing Status New 11/16/2019  9:10 PM  
Dressing Type Transparent 11/16/2019  9:10 PM  
Access Needle Size (Site #1) 20 G 11/16/2019  9:10 PM  
Access Needle Length (Medial Site) 1 inch 11/16/2019  8:40 PM  
Positive Blood Return (Medial Site) Yes 11/16/2019  8:40 PM  
Action Taken (Medial Site) Alcohol;Flushed 11/16/2019  8:40 PM  
  
 
Opportunity for questions and clarification was provided. Patient transported with: 
 [employer]

## 2019-11-17 NOTE — PROGRESS NOTES
Problem: Falls - Risk of  Goal: *Absence of Falls  Description  Document Vickii Bridges Fall Risk and appropriate interventions in the flowsheet. Outcome: Progressing Towards Goal  Note:   Fall Risk Interventions:  Mobility Interventions: Patient to call before getting OOB, Bed/chair exit alarm         Medication Interventions: Patient to call before getting OOB, Bed/chair exit alarm    Elimination Interventions: Patient to call for help with toileting needs, Call light in reach, Bed/chair exit alarm              Problem: Pressure Injury - Risk of  Goal: *Prevention of pressure injury  Description  Document Tejas Scale and appropriate interventions in the flowsheet. Outcome: Progressing Towards Goal  Note:   Pressure Injury Interventions:  Sensory Interventions: Assess changes in LOC, Minimize linen layers, Turn and reposition approx.  every two hours (pillows and wedges if needed), Pressure redistribution bed/mattress (bed type)         Activity Interventions: Pressure redistribution bed/mattress(bed type), PT/OT evaluation    Mobility Interventions: Pressure redistribution bed/mattress (bed type)    Nutrition Interventions: Document food/fluid/supplement intake, Discuss nutritional consult with provider, Offer support with meals,snacks and hydration    Friction and Shear Interventions: Minimize layers                Problem: Patient Education: Go to Patient Education Activity  Goal: Patient/Family Education  Outcome: Progressing Towards Goal

## 2019-11-17 NOTE — PROGRESS NOTES
Met with patient's daughter Rigoberto Randolph at bedside and informed her that [facility] could not cover case; she stated that they had pre-arranged with [facility] and that initial visit was to be tomorrow so will fax info to [facility] for hospice referral; per daughter, concerned that when patient's boyfriend goes to work 3-4 hrs in early a.m. That patient is left alone; had relayed concerns about patient monitoring to Dr. Hoang Rodney.

## 2019-11-17 NOTE — PROGRESS NOTES
0700 Assumed patient care from off-going nurse Jaspreet Rodriguez RN. Whiteboard updated, call bell within reach, bed wheels locked, and bed in lowest position. 0800 Assessment complete. Patient resting comfortably in bed. Patient complains of pain 10 out of 10 on back and buttocks. Gave PRN Morphine. See MAR.     1032 Back pain 10 out of 10. Gave PRN Morphine. See MAR.    1200 Reassessment complete. Patient resting comfortably in bed with family present. Patient states rectal pain or 10 out of 10. Call bell within reach. 1255 Gave PRN Morphine for rectal pain. See MAR.    1600 Reassessment complete. Patient resting comfortably in bed with family at bedside. Family is trying to feed patient and asked if we could wait on pain meds until after she eats. Patient agrees and wants to wait on meds as well. Call bell within reach. 1900 Bedside and Verbal shift change report given to Chuck Ahn RN (oncoming nurse) by Yohana Asher RN (offgoing nurse). Report included the following information SBAR, Kardex, Intake/Output, MAR and Recent Results.

## 2019-11-17 NOTE — PROGRESS NOTES
NUTRITION update    ASSESSMENT/PLAN:     Current Diet: Regular    Chart reviewed, note change in goals of care now focused on comfort measures only. Aggressive nutrition intervention is not indicated at this time. Will assist as needed; please consult if nutrition intervention is medically indicated and consistent with patient's goals of care.       Val Harrison RD  Pager:  [phone]

## 2019-11-17 NOTE — PROGRESS NOTES
Problem: Falls - Risk of  Goal: *Absence of Falls  Description  Document Nehemias Delatorre Fall Risk and appropriate interventions in the flowsheet. Outcome: Not Progressing Towards Goal  Note:   Fall Risk Interventions:  Mobility Interventions: Patient to call before getting OOB, Bed/chair exit alarm         Medication Interventions: Patient to call before getting OOB, Bed/chair exit alarm    Elimination Interventions: Patient to call for help with toileting needs, Call light in reach, Bed/chair exit alarm              Problem: Patient Education: Go to Patient Education Activity  Goal: Patient/Family Education  Outcome: Not Progressing Towards Goal     Problem: Pressure Injury - Risk of  Goal: *Prevention of pressure injury  Description  Document Tejas Scale and appropriate interventions in the flowsheet. Outcome: Not Progressing Towards Goal  Note:   Pressure Injury Interventions:  Sensory Interventions: Assess changes in LOC, Minimize linen layers, Turn and reposition approx.  every two hours (pillows and wedges if needed), Pressure redistribution bed/mattress (bed type)         Activity Interventions: Pressure redistribution bed/mattress(bed type), PT/OT evaluation    Mobility Interventions: Pressure redistribution bed/mattress (bed type)    Nutrition Interventions: Document food/fluid/supplement intake, Discuss nutritional consult with provider, Offer support with meals,snacks and hydration    Friction and Shear Interventions: Minimize layers                Problem: Patient Education: Go to Patient Education Activity  Goal: Patient/Family Education  Outcome: Not Progressing Towards Goal

## 2019-11-17 NOTE — PROGRESS NOTES
0000. Pt arrived from ED, transferred to bed. Pt is AOO, very weak and cachetic. Denies any pain. No acute distress noted. 0200. Patient resting quietly in bed with eyes closed. Respirations regular, no acute distress noted. Call bell within reach. 0430. Pt assisted to [facility] and back to bed. C/o constipation. No acute distress. MD notified, new orders received. 0700. Bedside and Verbal shift change report given to Fiorella Renner (oncoming nurse) by Elizabeth Haro RN (offgoing nurse). Report included the following information SBAR, Intake/Output, MAR and Recent Results.

## 2019-11-17 NOTE — ED PROVIDER NOTES
EMERGENCY DEPARTMENT HISTORY AND PHYSICAL EXAM    Date: 11/16/2019  Patient Name: Sadie Finnegan    History of Presenting Illness     Chief Complaint   Patient presents with    Rectal Pain         History Provided By: Patient and her daughter    Additional History (Context):   8:04 PM  Sadie Finnegan is a 60 y.o. female with PMHX of stage IV colon cancer who presents to the emergency department C/O rectal pain. Patient is been treated by Dr. Mary Ann Mendiola of [facility] with chemotherapy however she is been on on hold for the last several weeks trying to get her strength back up. She is already prescribed gabapentin for routine pain control with oxycodone immediate release 30 mg for breakthrough symptoms. The history for oral pain medication intake is variable sometimes teaching larger quantity amounts other times using little if any. She arrives with a card of neutropenia however there has been no history of fevers neutropenia or or anything as such according to the daughter who is here. They have arranged for getting home health however the nurse is not scheduled to be there until Monday and messages have been mixed and therefore no home help has been available for the last 10 days. The boyfriend and the daughter who is 7 months pregnant is in a separate home is occasionally there to help out but even she cannot be available to help pick Ms. Stapleton up and move her to place to place in order to help her get elimination of her bladder or bowels or simple drink of water as necessary. Reviewing the records she was last CAT scan available of August 1, 2019 demonstrating no blockage or obstruction of her bowels however there was extensive liver metastases without bony invasion. Constipation was a factor even back then. Stay was also somewhat limited to the level of cachexia. Patient is continuing to lose weight she is currently 5 foot 4 and 78 pounds. She does not smoke drink or use drugs.       Family history is negative      PCP: Erasmo Harvey MD    Current Outpatient Medications   Medication Sig Dispense Refill    LORazepam (INTENSOL) 2 mg/mL concentrated solution Take 0.25 mL by mouth every four (4) hours as needed for Anxiety. Max Daily Amount: 3 mg. 30 mL 0    naloxone (NARCAN) 4 mg/actuation nasal spray Use 1 spray intranasally, then discard. Repeat with new spray every 2 min as needed for opioid overdose symptoms, alternating nostrils. 2 Each 0    scopolamine (TRANSDERM-SCOP) 1 mg over 3 days pt3d 1 Patch by TransDERmal route every seventy-two (72) hours. 1 Patch 0    gabapentin (NEURONTIN) 300 mg capsule Take 300 mg by mouth. Past History     Past Medical History:  Past Medical History:   Diagnosis Date    Cancer [facility])     colon cancer       Past Surgical History:  History reviewed. No pertinent surgical history. Family History:  History reviewed. No pertinent family history. Social History:  Social History     Tobacco Use    Smoking status: Never Smoker    Smokeless tobacco: Never Used   Substance Use Topics    Alcohol use: Not Currently    Drug use: Not on file       Allergies:  No Known Allergies      Review of Systems   Review of Systems   Constitutional: Positive for activity change and appetite change. Negative for chills, diaphoresis, fever and unexpected weight change. HENT: Negative for congestion, drooling, ear pain, rhinorrhea, sore throat, tinnitus and trouble swallowing. Eyes: Negative for photophobia, pain, redness and visual disturbance. Respiratory: Negative for cough, choking, chest tightness, shortness of breath, wheezing and stridor. Cardiovascular: Negative for chest pain, palpitations and leg swelling. Gastrointestinal: Positive for rectal pain. Negative for abdominal distention, abdominal pain, anal bleeding, blood in stool, constipation, diarrhea, nausea and vomiting. Endocrine: Negative for cold intolerance, heat intolerance, polydipsia and polyuria. Genitourinary: Negative for difficulty urinating, dysuria, flank pain, frequency, hematuria and urgency. Musculoskeletal: Negative for arthralgias, back pain and neck pain. Skin: Negative for color change, rash and wound. Allergic/Immunologic: Negative for immunocompromised state. Neurological: Negative for dizziness, seizures, syncope, speech difficulty, light-headedness and headaches. Hematological: Does not bruise/bleed easily. Psychiatric/Behavioral: Negative for agitation, behavioral problems, hallucinations, self-injury and suicidal ideas. The patient is not hyperactive. Physical Exam     Vitals:    11/18/19 2320 11/18/19 2350 11/19/19 0250 11/19/19 0718   BP: 133/77  130/84 114/58   Pulse: 93  93 86   Resp: 18 18 17   Temp: 97.9 °F (36.6 °C)  97.8 °F (36.6 °C) 98.3 °F (36.8 °C)   SpO2: 99%  90% 100%   Weight:  36.3 kg (80 lb 1.6 oz)     Height:         Physical Exam   Constitutional: She is oriented to person, place, and time. She appears cachectic. No distress. Very thin and chronically ill-appearing   HENT:   Head: Normocephalic and atraumatic. Right Ear: External ear normal.   Left Ear: External ear normal.   Mouth/Throat: Oropharynx is clear and moist. No oropharyngeal exudate. Eyes: Pupils are equal, round, and reactive to light. Conjunctivae and EOM are normal. No scleral icterus. Moderate to severe pallor of both eyes. Neck: Normal range of motion. Neck supple. No JVD present. No tracheal deviation present. No thyromegaly present. Cardiovascular: Normal rate, regular rhythm and normal heart sounds. Pulmonary/Chest: Effort normal and breath sounds normal. No stridor. No respiratory distress. Abdominal: Soft. Bowel sounds are normal. She exhibits no distension. There is no tenderness. There is no rebound and no guarding. Genitourinary:   Genitourinary Comments: No visible palpable rectal mass administering examination. There is no active bleeding. Musculoskeletal: Normal range of motion. She exhibits no edema or tenderness. No soft tissue injuries   Lymphadenopathy:     She has no cervical adenopathy. Neurological: She is alert and oriented to person, place, and time. She has normal reflexes. No cranial nerve deficit. Coordination normal.   Skin: Skin is warm and dry. No rash noted. She is not diaphoretic. No erythema. Very poor skin turgor   Psychiatric: She has a normal mood and affect. Her behavior is normal. Judgment and thought content normal.   Occasionally flat mood with slow deliberate speech however occasionally she does smile and laugh evening as we talk and joke with her   Nursing note and vitals reviewed. Diagnostic Study Results     Labs -   No results found for this or any previous visit (from the past 12 hour(s)). Radiologic Studies -   CT ABD PELV W CONT   Final Result   IMPRESSION:      Overall there is worsening of patient's disease with enlarging liver metastases. There is new bilateral hydronephrosis and hydroureter right greater than left   with delayed nephrograms on the right. Suspect the ureters are being obstructed   by by the colonic malignancy in the pelvis. Enlarged kelsey hepatis lymph nodes. Again seen colonic malignancy of the rectosigmoid colon. Large amount of stool   is present in the colon. Findings are discussed with Dr. Emi Dorantes approximately   10:00 PM November 16, 2019.         CT Results  (Last 48 hours)    None        CXR Results  (Last 48 hours)    None          Medications given in the ED-  Medications   fentaNYL citrate (PF) injection 25 mcg (25 mcg IntraVENous Given 11/16/19 2048)   ondansetron (ZOFRAN) injection 4 mg (4 mg IntraVENous Given 11/16/19 2048)   iopamidol (ISOVUE 300) 61 % contrast injection 70 mL (70 mL IntraVENous Given 11/16/19 2103)   fentaNYL citrate (PF) injection 50 mcg (50 mcg IntraVENous Given 11/16/19 2255)   heparin (porcine) 100 unit/mL injection (500 Units Given 11/19/19 1144)         Medical Decision Making   I am the first provider for this patient. I reviewed the vital signs, available nursing notes, past medical history, past surgical history, family history and social history. Vital Signs-Reviewed the patient's vital signs. Pulse Oximetry Analysis -100% on room air    Cardiac Monitor:  Rate: 92 sinus rhythm bpm  Rhythm: Sinus rhythm        Records Reviewed: NURSING NOTES AND PREVIOUS MEDICAL RECORDS    Provider Notes (Medical Decision Making): This lady has evidence of worsening cachexia due to her stage IV colon cancer with signs of very significant anemia. Myself and the nurse helped her get up and go to the bathroom and she practically had to be carried she was so weak. There were times we could move her with her moving on her own power but she could only barely take a half step with basically 1/2 foot length at a time she is very weak and very light. She could have underlying cardiovascular disease obviously at her age of 60 however it is unlikely this fatigue represents acute MI pulmonary embolism there could be some pulmonary mets as colon cancer is known to metastasize to long months beyond the liver. We will x-ray and CAT scan looking for these complications as well as type and screen for very likely severity of anemia. She is already had a transfusion x2 units and will likely need more. Is highly likely she is can have electrolyte disturbance while we provide her IV fluids and assess for also disorders of glucose metabolism. Discussed the case with both Miranda Carey or whoever is on-call for her oncology team as well as the hospitalist as very poor likelihood this lady will be able to continue at home until she has very significant home care. Procedures:  Procedures    ED Course:   8:04 PM: Initial assessment performed.  The patients presenting problems have been discussed, and they are in agreement with the care plan formulated and outlined with them. I have encouraged them to ask questions as they arise throughout their visit. Consult note  10 :06 PM  Case was reviewed with Dr. Lamine Dao, oncologist on-call, we discussed all her findings and he was able to give me some of the feedback on Dr. Maxine Rasheed treatment plan. But very she has too much stability in order to go home at this timeframe. We will move with hospitalist service to seek admission for possible radiation therapy to decompress pain and potential for obstructive process    Consult note  10:45 PM  Case was reviewed with Dr. Yvette Vieira the hospitalist for the service this evening. She agrees with her current treatment plan and will admit patient to the floor. Diagnosis and Disposition     Critical Care Time: none    Core Measures:  For Hospitalized Patients:    1. Hospitalization Decision Time:  The decision to hospitalize the patient was made by Ross Payne MD   at 8:15 PM on 11/16/2019     2. Aspirin: Aspirin was not given because the patient is a bleeding risk    5:10 AM  Patient is being admitted to the hospital by Dr. Yvette Vieira. The results of their tests and reasons for their admission have been discussed with them and/or available family. They convey agreement and understanding for the need to be admitted and for their admission diagnosis. CONDITIONS ON ADMISSION:  Sepsis is not present at the time of admission. Deep Vein Thrombosis is not present at the time of admission. Thrombosis is not present at the time of admission. Urinary Tract Infection is not present at the time of admission. Pneumonia is not present at the time of admission. MRSA is not present at the time of admission. Wound infection is not present at the time of admission. Pressure Ulcer is not present at the time of admission. CLINICAL IMPRESSION:    1. H/O colon cancer, stage IV    2. Weakness    3. Iron deficiency anemia due to chronic blood loss    4.  Malignant neoplasm of ascending colon ([facility]    5. Advanced care planning/counseling discussion    6. Malignant neoplasm of colon, unspecified part of colon ([facility]    7. Rectal pain    8. Debility        _______________________________    This note was partially transcribed via voice recognition software. Although efforts have been made to catch any discrepancies, it may contain sound alike words, grammatical errors, or nonsensical words.

## 2019-11-17 NOTE — PROGRESS NOTES
Reason for Admission:   Chart reviewed as CM on call; patient is a 60 yr old female with stage IV colon cancer, admitted for hospice care. RRAT Score:     Lo, 5                Plan for utilizing home health:      Hospice vs SNF; see additional comments in transition of care plan                    Current Advanced Directive/Advance Care Plan: DNR                         Transition of Care Plan:           Received call to facilitate Hospice care with [facility] and discussed case with coordinator Geeta Harper with basic information; attempted to have [facility] signed but patient is essentially somnolent during CM visit and barely opened eyes slightly to verbal stimulation. Discussed recommendation for discharge home with hospice with daughter Matias Sharma, who expressed concerns that patient lives alone and will require 24/7 care which is not available; also received information back from Geeta Harper at [facility] that they cannot accept [employer] as primary insurance. Have page out to discuss w/ Dr. Isa Quintanilla for safe discharge plan.

## 2019-11-17 NOTE — H&P
History & Physical    Patient: Carlene Sanders MRN: [id number]  CSN: [id number]    YOB: 1955  Age: 60 y.o. Sex: female      DOA: 11/16/2019  Primary Care Provider:  None      Assessment/Plan     Patient Active Problem List   Diagnosis Code    Colon cancer ([facility] C18.9    Hospice care Z465     22-year-old female with stage IV colon cancer is admitted for hospice due to intractable pain and weakness. Comfort measures only   No IV fluids or lab draws   Morphine and Roxicodone for pain   Antiemetics and scopolamine for comfort   Consider transition to home hospice if the patient and family desire  Consider palliative radiation for rectal mass    CODE STATUS: DNR/DNI as confirmed by the patient and her daughter    Family discussion: I explained to the patient and her daughter that she may die relatively soon on hospice and the goal is to make her comfortable. They may consider home hospice if it can be arranged before she dies. Estimated length of stay : 2-3 nights    Zack Valdez MD  Nocturnist  ---------------------------------------------------------------------------------------------------------------------------------------------------------------------------  CC: Intractable pain and weakness       HPI:     Carlene Sanders is a 60 y.o. female who has been battling stage IV colon cancer for the past 2 years with progression of disease despite chemotherapy presents with her boyfriend and daughter for intractable pain and weakness. Her daughter reports over the past 3 weeks her mom has been getting much weaker and her boyfriend is unable to lift her around the house and she is 7-1/2 months pregnant. The patient states she is in severe pain. Her daughter states that hospice was offered but has not been set up yet. Past Medical History:   Diagnosis Date    Cancer [facility])     colon cancer       History reviewed. No pertinent surgical history. History reviewed.  No pertinent family history. Social History     Socioeconomic History    Marital status: SINGLE     Spouse name: Not on file    Number of children: Not on file    Years of education: Not on file    Highest education level: Not on file   Tobacco Use    Smoking status: Never Smoker    Smokeless tobacco: Never Used   Substance and Sexual Activity    Alcohol use: Not Currently       Prior to Admission medications    Medication Sig Start Date End Date Taking? Authorizing Provider   gabapentin (NEURONTIN) 300 mg capsule Take 300 mg by mouth. 10/16/19   OtherCristy MD   LORazepam (ATIVAN) 1 mg tablet  11/1/19   Cristy Reed MD   oxyCODONE IR (ROXICODONE) 30 mg immediate release tablet Take 30 mg by mouth. 11/5/19   Cristy Reed MD   oxyCODONE IR (ROXICODONE) 10 mg tab immediate release tablet Take  by mouth. Cristy Reed MD   diazePAM (VALIUM) 2 mg tablet Take 1 Tab by mouth every six (6) hours as needed (muscle spasm). Max Daily Amount: 8 mg. 1/19/18   Apollo Harrison MD       No Known Allergies    Review of Systems  Gen: No fever, chills, malaise, weight loss/gain. Heent: No headache, rhinorrhea, epistaxis, ear pain, hearing loss, sinus pain, neck pain/stiffness, sore throat. Heart: No chest pain, palpitations, SIM, pnd, or orthopnea. Resp: No cough, hemoptysis, wheezing and shortness of breath. GI: +nausea, vomiting, constipation. : No urinary obstruction, dysuria or hematuria. Derm: No rash, new skin lesion or pruritis. Musc/skeletal: no bone or joint complaints. Vasc: No edema, cyanosis or claudication. Endo: No heat/cold intolerance, no polyuria,polydipsia or polyphagia. Neuro: No unilateral weakness, numbness, tingling. No seizures. Heme: No easy bruising or bleeding.           Physical Exam:     Physical Exam:  Visit Vitals  /75 (BP 1 Location: Left arm, BP Patient Position: At rest)   Pulse 82   Temp 98.7 °F (37.1 °C)   Resp 14   Ht 5' 4\" (1.626 m)   Wt 35.4 kg (78 lb)   SpO2 100% BMI 13.39 kg/m²      O2 Device: Room air    Temp (24hrs), Av.5 °F (36.9 °C), Min:98 °F (36.7 °C), Max:98.7 °F (37.1 °C)     190 -  0700  In: 150 [I.V.:150]  Out: -    No intake/output data recorded. General:   Very ill-appearing, cachectic female in obvious pain   Head:  Normocephalic, without obvious abnormality   Eyes:  Conjunctivae/corneas clear, sclera anicteric. Neck: Supple, symmetrical, trachea midline, no adenopathy. Lungs:   Clear to auscultation bilaterally. Heart:  Regular rate and rhythm, S1, S2 normal, no murmur, click, rub or gallop. Abdomen:  Diffusely tender to palpation with hypoactive bowel sounds   Extremities: Extremities normal, atraumatic, no cyanosis or edema. Capillary refill normal.   Pulses: 2+ and symmetric all extremities. Skin: Skin color pale, turgor increased. Neurologic:  Alert, oriented, and conversant. No focal motor or sensory deficit. Labs Reviewed: All lab results for the last 24 hours reviewed.   Recent Results (from the past 24 hour(s))   URINALYSIS W/ RFLX MICROSCOPIC    Collection Time: 19  8:01 PM   Result Value Ref Range    Color YELLOW      Appearance CLEAR      Specific gravity 1.011 1.005 - 1.030      pH (UA) 6.0 5.0 - 8.0      Protein NEGATIVE  NEG mg/dL    Glucose NEGATIVE  NEG mg/dL    Ketone NEGATIVE  NEG mg/dL    Bilirubin NEGATIVE  NEG      Blood TRACE (A) NEG      Urobilinogen 1.0 0.2 - 1.0 EU/dL    Nitrites NEGATIVE  NEG      Leukocyte Esterase NEGATIVE  NEG     URINE MICROSCOPIC ONLY    Collection Time: 19  8:01 PM   Result Value Ref Range    WBC 0 to 3 0 - 5 /hpf    RBC 0 to 3 0 - 5 /hpf    Epithelial cells FEW 0 - 5 /lpf    Bacteria FEW (A) NEG /hpf   CBC WITH AUTOMATED DIFF    Collection Time: 19  8:30 PM   Result Value Ref Range    WBC 11.6 4.6 - 13.2 K/uL    RBC 3.76 (L) 4.20 - 5.30 M/uL    HGB 9.2 (L) 12.0 - 16.0 g/dL    HCT 29.8 (L) 35.0 - 45.0 %    MCV 79.3 74.0 - 97.0 FL    MCH 24.5 24.0 - 34.0 PG    MCHC 30.9 (L) 31.0 - 37.0 g/dL    RDW 19.1 (H) 11.6 - 14.5 %    PLATELET 757 814 - 024 K/uL    MPV 9.6 9.2 - 11.8 FL    NEUTROPHILS 84 (H) 40 - 73 %    LYMPHOCYTES 5 (L) 21 - 52 %    MONOCYTES 11 (H) 3 - 10 %    EOSINOPHILS 0 0 - 5 %    BASOPHILS 0 0 - 2 %    ABS. NEUTROPHILS 9.7 (H) 1.8 - 8.0 K/UL    ABS. LYMPHOCYTES 0.6 (L) 0.9 - 3.6 K/UL    ABS. MONOCYTES 1.3 (H) 0.05 - 1.2 K/UL    ABS. EOSINOPHILS 0.0 0.0 - 0.4 K/UL    ABS. BASOPHILS 0.0 0.0 - 0.1 K/UL    DF AUTOMATED      RBC COMMENTS ANISOCYTOSIS  2+        RBC COMMENTS HYPOCHROMIA  1+        RBC COMMENTS SPHEROCYTES  1+        RBC COMMENTS SCHISTOCYTES  1+        RBC COMMENTS OVALOCYTES  1+       METABOLIC PANEL, COMPREHENSIVE    Collection Time: 11/16/19  8:30 PM   Result Value Ref Range    Sodium 135 (L) 136 - 145 mmol/L    Potassium 3.7 3.5 - 5.5 mmol/L    Chloride 97 (L) 100 - 111 mmol/L    CO2 28 21 - 32 mmol/L    Anion gap 10 3.0 - 18 mmol/L    Glucose 94 74 - 99 mg/dL    BUN 13 7.0 - 18 MG/DL    Creatinine 1.03 0.6 - 1.3 MG/DL    BUN/Creatinine ratio 13 12 - 20      GFR est AA >60 >60 ml/min/1.73m2    GFR est non-AA 54 (L) >60 ml/min/1.73m2    Calcium 8.3 (L) 8.5 - 10.1 MG/DL    Bilirubin, total 0.5 0.2 - 1.0 MG/DL    ALT (SGPT) 14 13 - 56 U/L    AST (SGOT) 150 (H) 10 - 38 U/L    Alk.  phosphatase 237 (H) 45 - 117 U/L    Protein, total 7.3 6.4 - 8.2 g/dL    Albumin 2.7 (L) 3.4 - 5.0 g/dL    Globulin 4.6 (H) 2.0 - 4.0 g/dL    A-G Ratio 0.6 (L) 0.8 - 1.7     LIPASE    Collection Time: 11/16/19  8:30 PM   Result Value Ref Range    Lipase 156 73 - 393 U/L   MAGNESIUM    Collection Time: 11/16/19  8:30 PM   Result Value Ref Range    Magnesium 1.9 1.6 - 2.6 mg/dL   CARDIAC PANEL,(CK, CKMB & TROPONIN)    Collection Time: 11/16/19  8:30 PM   Result Value Ref Range     26 - 192 U/L    CK - MB <1.0 <3.6 ng/ml    CK-MB Index  0.0 - 4.0 %     CALCULATION NOT PERFORMED WHEN RESULT IS BELOW LINEAR LIMIT    Troponin-I, QT <0.02 0.0 - 0.045 NG/ML TYPE & SCREEN    Collection Time: 11/16/19  8:30 PM   Result Value Ref Range    Crossmatch Expiration 11/19/2019     ABO/Rh(D) B POSITIVE     Antibody screen NEG        Results   CT ABD PELV W CONT (Accession [id number]) (Order [id number])   Allergies      No Known Allergies   Exam Information     Status Exam Begun  Exam Ended    Final [99] 11/16/2019 21:29 11/16/2019 21:43   Result Information     Status: Final result (Exam End: 11/16/2019 21:43) Provider Status: Open   Study Result     EXAM: CT of the abdomen and pelvis     INDICATION: Stage IV colon cancer evaluate for obstruction bony metastasis or  perforation     COMPARISON: Outside CT dated August 1, 2019     TECHNIQUE: Axial CT imaging of the abdomen and pelvis was performed with  intravenous contrast. Multiplanar reformats were generated. One or more dose  reduction techniques were used on this CT: automated exposure control,  adjustment of the mAs and/or kVp according to patient size, and iterative  reconstruction techniques. The specific techniques used on this CT exam have  been documented in the patient's electronic medical record. Digital Imaging and  Communications in Medicine (DICOM) format image data are available to  nonaffiliated external healthcare facilities or entities on a secure, media  free, reciprocally searchable basis with patient authorization for at least a  12-month period after this study.     _______________     FINDINGS:     LOWER CHEST: There is small right effusion.     LIVER, BILIARY: There are large liver masses hepatic metastasis. Largest is seen  in the right hepatic lobe measuring 13.2 x 7.2 cm. Second largest is seen in the  right hepatic lobe inferiorly measuring 8.4 x 6.6 cm. Both of these masses are  increased in size from prior outside CT. Other smaller metastases are seen in  the liver. Common bile duct is within normal limits for patient's age at 7 mm.   Gallbladder is difficult identified.     PANCREAS: Normal.     SPLEEN: Normal.     ADRENALS: Normal.     KIDNEYS: Left kidney demonstrates mild-to-moderate hydronephrosis. Right kidney  demonstrates moderate to severe hydronephrosis. Right kidney demonstrates  delayed nephrograms. Both ureters are dilated down to the pelvis and are likely  being obstructed by the colonic mass. Urology consultation advised.     VASCULATURE: Unremarkable     LYMPH NODES: There are enlarged kelsey hepatis lymph nodes measuring up to 17 mm.     GASTROINTESTINAL TRACT: There is extensive circumferential soft tissue  thickening about the rectosigmoid colon likely representing the patient's  colonic malignancy. The margins of this mass are difficult to distinguish  surrounding soft tissue structures. There is loss of fat plane.     PELVIC ORGANS: Calcified fibroid present in the uterus. Urinary bladder is under  distended therefore difficult to evaluate.     BONES: No acute or aggressive osseous abnormalities identified.     OTHER: None.     _______________     IMPRESSION  IMPRESSION:     Overall there is worsening of patient's disease with enlarging liver metastases.     There is new bilateral hydronephrosis and hydroureter right greater than left  with delayed nephrograms on the right. Suspect the ureters are being obstructed  by by the colonic malignancy in the pelvis.     Enlarged kelsey hepatis lymph nodes.     Again seen colonic malignancy of the rectosigmoid colon. Large amount of stool  is present in the colon. Findings are discussed with Dr. Guille Lopez approximately  10:00 PM November 16, 2019.

## 2019-11-17 NOTE — CONSULTS
[facility] [employer] [facility]  Phone: [phone]  Paging : (  [address]) [phone] ([phone]  [place]) [phone] [facility]) [phone]     Hematology / Oncology Consult Note    Impression:   Principal Problem:    Hospice care (11/17/2019)    Active Problems:    Colon cancer ([address] (11/16/2019)      Weakness (11/17/2019)    Metastatic Colon Cancer - not candidate for chemotherapy, already progressed on FOLFOX and FOLFIRI  Cachexia - dt/ end stage cancer  Delirium - dt/ end stage cancer and pain meds- acceptable in this situation - I would not treat her delirium at the moment  Hydronephrosis - dt/ renal obstruction from tumor  Dehydration - dt/ end stage cancer    Anticipate death shortly    Plan:   1) Hospice - home v. Inpatient  2) Continue IV Morphine PRN w/ liquid po        Reason for Consultation:  Angélica Kincaid is a 60 y.o. female who I've been asked to consult for metastatic colon cancer    Admitted with worsening abdominal pain  Seen last in clinic last month  They had discussed continuing chemotherapy v. Enrollment into hospice  DNR/DNI had already been signed  Patient wanted to continue on chemotherapy  History of disease noted below   Not interactive at beside  Confused, cannot recall history    1. 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 She was initiated on FOLFOX on 01/16/18. She was seen in ER on 01/22/18 secondary to dehydration following chemotherapy. CT scan chest, abdomen, pelvis on 04/06/18 revealed mixed interval treatment response within the liver, several hepatic lesions demonstrating slight decrease in size and decreased attenuation while additional lesions demonstrate slight increase in size, no conclusive new lesions identified within the liver, decreased in size of retroperitoneal/pelvic adenopathy, persistent area of irregular wall thickening involving the distal sigmoid colon/proximal rectum. She was treated with maintenance chemotherapy with 5-FU. She was seen and evaluated by Dr. King Aparicio on 08/01/18 for consideration of palliative surgery but decided against surgery. She was also seen and evaluated by Dr. Ivon Centeno on 8/3/18 for consideration of palliative radiation therapy but the patient declined radiation therapy. She underwent CT scan of the chest, abdomen and pelvis dated 07/26/18, which revealed response to treatment. CT chest, abdomen, pelvis on 12/1/2018 revealed interval decrease in size of dominant lesion in the right hepatic lobe with no new metastasis. Positive progression of disease on MRI dated 03/22/19. Had motion artifact, but no mass in the deep pelvis; however, CT scan of the chest, abdomen, and pelvis on 04/01/19 showed evidence of disease progression with new or enlarging parenchymal nodules in the right upper lobe and right lower lobe of the lung as well as interval disease progression within the abdomen with increase in size of the right hepatic lobe lesions. She completed palliative radiation therapy 30 Gy to rectosigmoid junction from 4/8/19-4/19/19. She had superior hypogastric plexus block on 4/23/19 under the direction of Dr. Jamil Lovell. 6/24/19 restarted FOLFIRI. CT chest, abdomen and pelvis 8/1/19 revealed interval progression of hepatic metastatic disease now measuring 7.1 x 5.3 cm, previously 2.3 x 2.7 cm. 1.6 cm portahepatic lymph node is suspected suspicious for metastatic disease. Stable noncalcified nodules in right lung. Dose reduced Irinotecan from 180 mg/m2 to 150 mg/m2 and Flourouracil from 400 mg/m2 to 300 mg/m2 as well as 5-FU from 2400 mg/m2 to 2000 mg/m2. The patient delayed treatment until 8/19/19. The patient reported significant toxicity to cycle 2 FOLFIRI with nausea and generalized weakness to the point that she spent about a week in bed. Of note, the patient did not contact her office to report that she was not feeling well.  She did not answer calls when her nurses checked on her when she did not come in for cycle 3 of treatment. Cycle 3 will be delayed until 10/1/2019 with a further radial reduction in dose with irinotecan to be given at 100 mg/m², fluorouracil decreased to 200 mg/m² and continuous infusion 5-FU reduced to 1500 mg/m².           Past Medical History:   Diagnosis Date    Cancer ([address]     colon cancer     Sh - boyfriend, lives in [address] [place] [address] Way - no fam hx of colon cancer    Home Medications:     Hospital Medications:     Current Facility-Administered Medications   Medication Dose Route Frequency Provider Last Rate Last Dose    scopolamine (TRANSDERM-SCOP) 1 mg over 3 days 1 Patch  1 Patch TransDERmal Q72H Luisa Bonner MD   1 Patch at 11/17/19 0116    LORazepam (ATIVAN) injection 1 mg  1 mg IntraVENous Q15MIN PRN Luisa Bonner MD        acetaminophen (TYLENOL) tablet 650 mg  650 mg Oral Q4H PRN Luisa Bonner MD        Or    acetaminophen (TYLENOL) solution 650 mg  650 mg Oral Q4H PRN Luisa Bonner MD        Or    acetaminophen (TYLENOL) suppository 650 mg  650 mg Rectal Q4H PRN Luisa Bonner MD        polyethylene glycol (MIRALAX) packet 17 g  17 g Oral DAILY Luisa Bonner MD        docusate sodium (COLACE) capsule 100 mg  100 mg Oral BID Luisa Bonner MD        morphine injection 2 mg  2 mg IntraVENous Q2H PRN Luisa Bonner MD   2 mg at 11/17/19 4153    oxyCODONE IR (ROXICODONE) tablet 5 mg  5 mg Oral Q4H PRN Luisa Bonner MD        ondansetron [facility]) injection 4 mg  4 mg IntraVENous Q6H PRN Luisa Bonner MD           Review of Systems:   Constitutional:   Fever: Negative, Chills: Negative, Weight Loss: Negative, Malaise/Fatigue: Negative   Diaphoresis: Negative,  Weakness: Negative  Skin:   Rash: Negative,  Itching: Negative  HENT:   Headache:Negative, Hearing Loss: Negative, Tinnitus: Negative, Ear Pain: Negative   Nosebleeds: Negative, Congestion: Negative, Stridor: Negative,   Sore Throat: Negative  Eyes:    Blurred Vision: Negative, Double Vision: Negative, Photophobia: Negative   Eye Pain: Negative, Eye Discharge: Negative, Eye Redness: Negative  Cardiovascular:    Chest Pain: Negative, Palpitations: Negative, Orthopnea: Negative   Claudication: Negative, Leg Swelling: Negative PND: Negative  Respiratory:   Cough: Negative, Hemoptysis: Negative, Sputum Production: Negative   Shortness of Breath: Negative, Wheezing: Negative  Gastrointestinal:   Heartburn: Negative, Nausea: Negative, Vomiting: Negative   Abdominal Pain: Negative, Diarrhea: Negative, Constipation: Negative   Blood in Stool: Negative, Melena: Negative   Genitourinary:    Dysuria: Negative, Urgency: Negative, Frequency: Negative   Hematuria: Negative, Flank Pain: Negative  Musculoskeletal:    Myalgias: Negative, Neck Pain: Negative, Back Pain: Negative   Joint Pain: Negative, Falls: Negative  Endo/Heme/Allergies:    Easy Bruise/Blood: Negative, Env. Allergies: Negative, Polydipsia: Negative   Neurological:    Dizziness: Negative, Tingling: Negative. Tremor: Negative,    Sensory Change: Negative.  Speech Change: Negative, Focal Weakness: Negative     Seizures: Negative, LOC: Negative  Psychiatric:   Depression: Negative, Suicidal Ideas: Negative, Substance Abuse: Negative   Hallucinations: Negative, Nervous/Anxious: Negative   Insomnia: Negative, Memory Loss: Negative     Visit Vitals  /77 (BP 1 Location: Left arm, BP Patient Position: At rest)   Pulse 77   Temp 97.8 °F (36.6 °C)   Resp 18   Ht 5' 4\" (1.626 m)   Wt 35.4 kg (78 lb)   SpO2 100%   BMI 13.39 kg/m²       Temp (24hrs), Av.3 °F (36.8 °C), Min:97.8 °F (36.6 °C), Max:98.7 °F (37.1 °C)      gen- ill appearing, not awake  heent- anicterc, no lymphadenopathy  cv - mild tachycardia, no murmur  Lung - clear to auscultation  abd - nontender, bs+  Per - no rash, no edema  msk - severe sarcopenia, does not want to move extyremities  Neuro - aox1, moves eyes    Labs:  Recent Labs     11/16/19 2030   WBC 11.6   RBC 3.76*   HCT 29.8*   MCV 79.3   MCH 24.5   MCHC 30.9*   RDW 19.1*       Recent Labs     11/16/19 2030   CO2 28   BUN 13       No results for input(s): ALBUMIN in the last 72 hours. No lab exists for component: TOTPR, ALKPHOS    IMPRESSION  IMPRESSION:     Overall there is worsening of patient's disease with enlarging liver metastases.     There is new bilateral hydronephrosis and hydroureter right greater than left  with delayed nephrograms on the right. Suspect the ureters are being obstructed  by by the colonic malignancy in the pelvis.     Enlarged kelsey hepatis lymph nodes.     Again seen colonic malignancy of the rectosigmoid colon. Large amount of stool  is present in the colon. Findings are discussed with Dr. Bruno Chambers approximately  10:00 PM November 16, 2019.     Reviewed imaging myself and agree that there is near complete replacement of the liver with tumor      Denson Rinne, MD MD  [facility]

## 2019-11-17 NOTE — ED TRIAGE NOTES
Triage: pt with hx of rectal cancer, scheduled for hospice admission tomorrow. Last chemo treatment 8 week s ago. Chemo stopped in hopes of pt increasing appetite and gain weight. Pt with increased rectal pain this evening. Was attempting to stand and walk around the house. Pt daughter states that she was uncomfortable with pt walking around house and was worried that pt would fall. Called EMS due to fall risk and increase in pain. Last BM today. Pt taking oxycodone for pain control at home. Unsure of last dose.

## 2019-11-17 NOTE — PROGRESS NOTES
TRANSFER - IN REPORT:    Verbal report received from YOEL Marcos on Tamara Curtis  being received from ED for routine progression of care      Report consisted of patients Situation, Background, Assessment and   Recommendations(SBAR). Information from the following report(s) SBAR, Kardex, ED Summary, MAR, Recent Results and Med Rec Status was reviewed with the receiving nurse. Opportunity for questions and clarification was provided.

## 2019-11-17 NOTE — DISCHARGE SUMMARY
Discharge Summary  Subjective:       Admit Date: 11/16/2019  Discharge Date:  11/17/2019, 8:24 AM    Discharging Physician: Dimple Larsen pager [phone]  Primary Care Provider:  None    Patient ID:  Rancho Reed  60 y.o. female  1955    Reason for Admission:  Colon cancer [facility]) [C18.9]    Discharge Diagnosis:   - rectal cancer  - metabolic encephalopathy  - severe protein calorie malnutrition      Patient Active Problem List   Diagnosis Code    Colon cancer ([facility] [address] C18.9    Hospice care Z51.5    Weakness R53.1       Consultants:    none    Hospital Course:   Reason for admission ( Admitting HPI): \" Rancho Reed is a 60 y.o. female who has been battling stage IV colon cancer for the past 2 years with progression of disease despite chemotherapy presents with her boyfriend and daughter for intractable pain and weakness. Her daughter reports over the past 3 weeks her mom has been getting much weaker and her boyfriend is unable to lift her around the house and she is 7-1/2 months pregnant. The patient states she is in severe pain. Her daughter states that hospice was offered but has not been set up yet. \"    Patient was admitted to the hospital and started on comfort measures w adequate control of her pain. She is bedbound, KPS 30, PPS 30. Pertinent Imaging/ Operative procedures:   CT abdomen/pelvis:\" Overall there is worsening of patient's disease with enlarging liver metastases.     There is new bilateral hydronephrosis and hydroureter right greater than left  with delayed nephrograms on the right. Suspect the ureters are being obstructed  by by the colonic malignancy in the pelvis.     Enlarged kelsey hepatis lymph nodes.     Again seen colonic malignancy of the rectosigmoid colon. Large amount of stool  is present in the colon. Findings are discussed with Dr. Minna Ramirez approximately  10:00 PM November 16, 2019. \"    Pertinent Results:    BMP:   Lab Results   Component Value Date/Time     (L) 11/16/2019 08:30 PM    K 3.7 11/16/2019 08:30 PM    CL 97 (L) 11/16/2019 08:30 PM    CO2 28 11/16/2019 08:30 PM    AGAP 10 11/16/2019 08:30 PM    GLU 94 11/16/2019 08:30 PM    BUN 13 11/16/2019 08:30 PM    CREA 1.03 11/16/2019 08:30 PM    GFRAA >60 11/16/2019 08:30 PM    GFRNA 54 (L) 11/16/2019 08:30 PM     CBC:   Lab Results   Component Value Date/Time    WBC 11.6 11/16/2019 08:30 PM    HGB 9.2 (L) 11/16/2019 08:30 PM    HCT 29.8 (L) 11/16/2019 08:30 PM     11/16/2019 08:30 PM         Physical Exam on Discharge:  Visit Vitals  /77 (BP 1 Location: Left arm, BP Patient Position: At rest)   Pulse 77   Temp 97.8 °F (36.6 °C)   Resp 18   Ht 5' 4\" (1.626 m)   Wt 35.4 kg (78 lb)   SpO2 100%   BMI 13.39 kg/m²     Body mass index is 13.39 kg/m². GEN: NAD  HEART:S1 S2  NEURO: nonfocal   Condition at discharge: stable    Discharge Medications  Current Discharge Medication List      START taking these medications    Details   LORazepam (INTENSOL) 2 mg/mL concentrated solution Take 0.25 mL by mouth every four (4) hours as needed for Anxiety. Max Daily Amount: 3 mg. Qty: 30 mL, Refills: 0    Associated Diagnoses: Malignant neoplasm of ascending colon (HCC)      morphine (ROXANOL) 100 mg/5 mL (20 mg/mL) concentrated solution Take 0.5 mL by mouth every two (2) hours as needed for Pain for up to 3 days. Max Daily Amount: 120 mg.  Qty: 30 mL, Refills: 0    Associated Diagnoses: Malignant neoplasm of ascending colon (HCC)      scopolamine (TRANSDERM-SCOP) 1 mg over 3 days pt3d 1 Patch by TransDERmal route every seventy-two (72) hours. Qty: 1 Patch, Refills: 0      oxyCODONE ER (OXYCONTIN) 10 mg ER tablet Take 1 Tab by mouth every twelve (12) hours for 30 days.  Max Daily Amount: 20 mg.  Qty: 40 Tab, Refills: 0    Associated Diagnoses: Mal[facility]         CONTINUE these medications which have CHANGED    Details   oxyCODONE IR (ROXICODONE) 5 mg immediate release tablet Take 1 Tab by mouth every four (4) hours as needed for Pain for up to 3 days. Max Daily Amount: 30 mg.  Qty: 40 Tab, Refills: 0    Associated Diagnoses: Malignant neoplasm of ascending colon ([address]         CONTINUE these medications which have NOT CHANGED    Details   gabapentin (NEURONTIN) 300 mg capsule Take 300 mg by mouth.          STOP taking these medications       LORazepam (ATIVAN) 1 mg tablet Comments:   Reason for Stopping:         diazePAM (VALIUM) 2 mg tablet Comments:   Reason for Stopping:               Disposition: home w hospice      Diagnostic Imaging/Labs pending at discharge: none      Riccardo Lord, [address] [facility]  Internal Medicine/Geriatrics    Time Spent 45 minutes with >50% coordination of care          CC: None

## 2019-11-18 ENCOUNTER — HOME CARE VISIT (OUTPATIENT)
Dept: HOSPICE | Facility: HOSPICE | Age: 64
End: 2019-11-18

## 2019-11-18 ENCOUNTER — HOSPICE ADMISSION (OUTPATIENT)
Dept: HOSPICE | Facility: HOSPICE | Age: 64
End: 2019-11-18

## 2019-11-18 LAB
BACTERIA SPEC CULT: NORMAL
SERVICE CMNT-IMP: NORMAL

## 2019-11-18 PROCEDURE — 65270000029 HC RM PRIVATE

## 2019-11-18 PROCEDURE — 76450000000

## 2019-11-18 PROCEDURE — 74011250636 HC RX REV CODE- 250/636: Performed by: FAMILY MEDICINE

## 2019-11-18 PROCEDURE — 74011250637 HC RX REV CODE- 250/637: Performed by: INTERNAL MEDICINE

## 2019-11-18 PROCEDURE — 74011250637 HC RX REV CODE- 250/637: Performed by: FAMILY MEDICINE

## 2019-11-18 RX ADMIN — DOCUSATE SODIUM 100 MG: 100 CAPSULE, LIQUID FILLED ORAL at 10:03

## 2019-11-18 RX ADMIN — MORPHINE SULFATE 10 MG: 100 SOLUTION ORAL at 17:41

## 2019-11-18 RX ADMIN — MORPHINE SULFATE 10 MG: 100 SOLUTION ORAL at 19:54

## 2019-11-18 RX ADMIN — MORPHINE SULFATE 2 MG: 2 INJECTION, SOLUTION INTRAMUSCULAR; INTRAVENOUS at 08:21

## 2019-11-18 RX ADMIN — MORPHINE SULFATE 10 MG: 100 SOLUTION ORAL at 10:59

## 2019-11-18 RX ADMIN — MORPHINE SULFATE 2 MG: 2 INJECTION, SOLUTION INTRAMUSCULAR; INTRAVENOUS at 03:29

## 2019-11-18 RX ADMIN — MORPHINE SULFATE 2 MG: 2 INJECTION, SOLUTION INTRAMUSCULAR; INTRAVENOUS at 23:50

## 2019-11-18 RX ADMIN — OXYCODONE HYDROCHLORIDE 10 MG: 10 TABLET, FILM COATED, EXTENDED RELEASE ORAL at 21:40

## 2019-11-18 RX ADMIN — POLYETHYLENE GLYCOL 3350 17 G: 17 POWDER, FOR SOLUTION ORAL at 10:03

## 2019-11-18 RX ADMIN — DOCUSATE SODIUM 100 MG: 100 CAPSULE, LIQUID FILLED ORAL at 21:40

## 2019-11-18 RX ADMIN — MORPHINE SULFATE 10 MG: 100 SOLUTION ORAL at 13:03

## 2019-11-18 RX ADMIN — OXYCODONE HYDROCHLORIDE 10 MG: 10 TABLET, FILM COATED, EXTENDED RELEASE ORAL at 10:03

## 2019-11-18 NOTE — ACP (ADVANCE CARE PLANNING)
Ms. Suzy Webb doesn't have an AMD or Living Will. She isn't  and has only one child, daughter Elis Schwartz. Met with pt and daughter to complete POST form for d/c. Pt has signed DDNR from oncologist office. POST form states wishes DNR/DNI, comfort measures only and no feeding tubes. Placed on chart for scanning and provided copies to family. Plan is for d/c home with hospice services tomorrow.     DNR/DNI    NOK: Elis Schwartz (daughter)  [phone]

## 2019-11-18 NOTE — PROGRESS NOTES
Hospitalist Progress Note-critical care note     Patient: Oscar Morrissey MRN: [id number]  CSN: [id number]    YOB: 1955  Age: 60 y.o. Sex: female    DOA: 11/16/2019 LOS:  LOS: 2 days            Chief complaint: hospice care, weakness, colon cancer     Assessment/Plan         Hospital Problems  Date Reviewed: 11/17/2019          Codes Class Noted POA    * (Principal) Hospice care ICD-10-CM: Z51.5  ICD-9-CM: V66.7  11/17/2019 Yes        Weakness ICD-10-CM: R53.1  ICD-9-CM: 780.79  11/17/2019 Yes        Colon cancer ([facility] [address] ICD-10-CM: C18.9  ICD-9-CM: 153.9  11/16/2019 Yes              Colon cancer, will go to hospice care tomorrow  Continue pain management. [facility] is on board, will deliver equipment to today    Hospice care  DNR/DNI now, will go home tomorrow with hospice. Weakness, due to colon cancer    Daughter was at bedside, CM was on board will discharge patient home tomorrow with hospice    Review of systems:    General: No fevers or chills. Cardiovascular: No chest pain or pressure. No palpitations. Pulmonary: No shortness of breath. Gastrointestinal: No nausea, vomiting. Vital signs/Intake and Output:  Visit Vitals  /79   Pulse 82   Temp 98.2 °F (36.8 °C)   Resp 16   Ht 5' 4\" (1.626 m)   Wt 35.4 kg (78 lb)   SpO2 100%   BMI 13.39 kg/m²     Current Shift:  No intake/output data recorded. Last three shifts:  11/16 1901 - 11/18 0700  In: 150 [I.V.:150]  Out: -     Physical Exam:  General: WD, WN. Alert, cooperative, no acute distress    HEENT: NC, Atraumatic. PERRLA, anicteric sclerae. Lungs: CTA Bilaterally. No Wheezing/Rhonchi/Rales. Heart:  Regular  rhythm,  No murmur, No Rubs, No Gallops  Abdomen: Soft, Non distended, Non tender.  +Bowel sounds,   Extremities: No c/c/e  Psych:   Not anxious or agitated. Neurologic:  No acute neurological deficit.              Labs: Results:       Chemistry Recent Labs     11/16/19 2030   GLU 94   *   K 3.7   CL 97*   CO2 28   BUN 13 CREA 1.03   CA 8.3*   AGAP 10   BUCR 13   *   TP 7.3   ALB 2.7*   GLOB 4.6*   AGRAT 0.6*      CBC w/Diff Recent Labs     11/16/19 2030   WBC 11.6   RBC 3.76*   HGB 9.2*   HCT 29.8*      GRANS 84*   LYMPH 5*   EOS 0      Cardiac Enzymes Recent Labs     11/16/19 2030      CKND1 CALCULATION NOT PERFORMED WHEN RESULT IS BELOW LINEAR LIMIT      Coagulation No results for input(s): PTP, INR, APTT, INREXT in the last 72 hours. Lipid Panel No results found for: CHOL, CHOLPOCT, CHOLX, CHLST, CHOLV, 405076, HDL, HDLP, LDL, LDLC, DLDLP, 411507, VLDLC, VLDL, TGLX, TRIGL, TRIGP, TGLPOCT, CHHD, CHHDX   BNP No results for input(s): BNPP in the last 72 hours.    Liver Enzymes Recent Labs     11/16/19 2030   TP 7.3   ALB 2.7*   *   SGOT 150*      Thyroid Studies No results found for: T4, T3U, TSH, TSHEXT     Procedures/imaging: see electronic medical records for all procedures/Xrays and details which were not copied into this note but were reviewed prior to creation of Belkys Lr MD

## 2019-11-18 NOTE — PROGRESS NOTES
telephone call from Gwendolyn Moore from [facility] [phone] and cell number [phone] states she has spoken with patients daughter Melissa Funez and she would like to transition her home tomorrow with hospice. Palliative care has a consult. Gwendolyn Moore informed cm she would be able to accommodate er tomorrow with all of her DME as well. Cm has called Ricardo Hurt daughter [phone] left vm   CM will continue to follow    Met with patient and dr. Mauri Springer at bedside about 1130. Cm has spoken with daughter several times. States she is aware of d/c plan home tomorrow with hospice. Steffi Trimble from hospice [phone] at bedside she is aware of plan for transportation to be set up around 10-11am in am. Equipment tot be delivered to home today per [facility]. Dr Mauri Springer is aware    5 UAI completed gave copy to family and faxed to [facility] as requested  Care Management Interventions  PCP Verified by CM: Yes  Mode of Transport at Discharge: BLS  Transition of Care Consult (CM Consult): [place]: No  Reason Outside [place]: Patient already serviced by other home [address]:  Other  Confirm Follow Up Transport: Family  Plan discussed with Pt/Family/Caregiver: Yes  Freedom of Choice Offered: Yes  Discharge Location  Discharge Placement: Home with hospice

## 2019-11-18 NOTE — CONSULTS
[facility]: [phone])  [facility]: [address]: [phone]    Patient Name: Tamara Curtis  YOB: 1955    Date of Initial Consult: 11/18/2019   Reason for Consult: care decisions   Requesting Provider: Miguelangel Tenorio MD  Primary Care Physician: None      SUMMARY:   Tamara Curtis is a 60y.o. year old with a past history of metastatic colon cancer,stage IV , who was admitted on 11/16/2019 from home  with a diagnosis of intractable pain. Current medical issues leading to Palliative Medicine involvement include: 60year old female who met colon cancer, no longer desires chemotherapy. She presented to the ER with intractable abdominal pain and rectal pain. Patient and family are interested in home hospice services when pain is better controlled. Palliative medicine is consulted for support and care decisions. PALLIATIVE DIAGNOSES:   1. Advanced care plan discussion   2. Colon cancer   3. Abdominal/ rectal pain   4. Debility        PLAN:   1. Advanced care plan discussion Seen along with Ms Darwin Devine is alert and oriented x 4. Very weak and fatigued, but can participate in conversation with her daughter's assistance. [facility] has not completed an AMD. Today she did not feel well enough to complete. Social : lives with her boyfriend, not , has one daughter Sia. Daughter Alma Orta is legal next of kin and was present for discussions. Patient would like to return home with hospice services and have already decided to use [facility] ( they have used them for Home Health). Goals of care affirmed DNR/DNI. POST form introduced and discussed. Patient was able to participate in conversation by nodding. POST completed for DNR/DNI. Comfort measures, no feeding tubes. Hospice referral has been placed . Plan home tomorrow with hospice services.  Support offered to Davie Latif during this difficult time.   2. Colon cancer with mets has been followed by Dr. Edil Musa of [facility]. 15. No further chemotherapy. Home with hospice tomorrow  3. Abd pain / rectal pain. Roxanol 10 mg q 2 hrs PRN on board. Finds Roxanol more effective then Morphine IV. Recommend sending home with RX of Roxanol 10 to 20 mg q 1 to 2 hrs PRN pain. 4. Debility weak, very thin. Alb 2.7. Needs assistance with ADL's. Appetite poor. Discussed with Sia, allow Amy Guallpa to eat and drink what is pleasurable. 5. Initial consult note routed to primary continuity provider  6. Communicated plan of care with: Palliative IDT, patient, daughter, CM    GOALS OF CARE: home with hospice   Patient/Health Care Proxy Stated Goals: Comfort      TREATMENT PREFERENCES:   Code Status: DNR    Advance Care Planning:  [] The [facility] Interdisciplinary Team has updated the ACP Navigator with [address] and Patient Capacity    Primary Decision Maker ([address]): legal next of kin is charleen Stovall     Medical Interventions: Comfort measures     Artificially Administered Nutrition: No feeding tube     Other:  As far as possible, the palliative care team has discussed with patient / health care proxy about goals of care / treatment preferences for patient.      HISTORY:     History obtained from: chart, daughter     CHIEF COMPLAINT: abdominal / rectal pain     HPI/SUBJECTIVE:    The patient is:   [x] Verbal and participatory somewhat limited due to weakness   [] Non-participatory due to:   Please see summary     Clinical Pain Assessment (nonverbal scale for nonverbal patients): Clinical Pain Assessment  Severity: 4          Duration: for how long has pt been experiencing pain (e.g., 2 days, 1 month, years)  Frequency: how often pain is an issue (e.g., several times per day, once every few days, constant)     FUNCTIONAL ASSESSMENT:     Palliative Performance Scale (PPS):  PPS: 30    ECOG  ECOG Status : Completely disabled     PSYCHOSOCIAL/SPIRITUAL SCREENING: Any spiritual / Anglican concerns:  [] Yes /  [x] No    Caregiver Burnout:  [] Yes /  [x] No /  [] No Caregiver Present      Anticipatory grief assessment:   [x] Normal  / [] Maladaptive        REVIEW OF SYSTEMS:     Positive and pertinent negative findings in ROS are noted above in HPI. The following systems were [x] reviewed / [] unable to be reviewed as noted in HPI  Other findings are noted below. Systems: constitutional, ears/nose/mouth/throat, respiratory, gastrointestinal, genitourinary, musculoskeletal, integumentary, neurologic, psychiatric, endocrine. Positive findings noted below. Modified ESAS Completed by: provider   Fatigue: 8       Pain: 4   Anxiety: 0 Nausea: 0   Anorexia: 8 Dyspnea: 0                    PHYSICAL EXAM:     Wt Readings from Last 3 Encounters:   11/16/19 35.4 kg (78 lb)   01/19/18 40.8 kg (90 lb)     Blood pressure 139/79, pulse 82, temperature 98.2 °F (36.8 °C), resp. rate 16, height 5' 4\" (1.626 m), weight 35.4 kg (78 lb), SpO2 100 %. Pain:  Pain Scale 1: Numeric (0 - 10)  Pain Intensity 1: 4     Pain Location 1: (general)     Pain Description 1: Aching  Pain Intervention(s) 1: Position  Last bowel movement: none recorded     Constitutional: thin, weak, ill appearing female who is lying in bed in NAD   Eyes: pupils equal, anicteric  Respiratory: breathing not labored  Musculoskeletal: thin extremities, temporal wasting   Skin: warm, dry  Neurologic: following commands, moving all extremities, alert oriented x 4 voice weak          HISTORY:     Principal Problem:    Hospice care (11/17/2019)    Active Problems:    Colon cancer ([address] (11/16/2019)      Weakness (11/17/2019)      Past Medical History:   Diagnosis Date    Cancer [facility])     colon cancer      History reviewed. No pertinent surgical history. History reviewed. No pertinent family history. History reviewed, no pertinent family history.   Social History     Tobacco Use    Smoking status: Never Smoker    Smokeless tobacco: Never Used   Substance Use Topics    Alcohol use: Not Currently     No Known Allergies   Current Facility-Administered Medications   Medication Dose Route Frequency    scopolamine (TRANSDERM-SCOP) 1 mg over 3 days 1 Patch  1 Patch TransDERmal Q72H    LORazepam (ATIVAN) injection 1 mg  1 mg IntraVENous Q15MIN PRN    acetaminophen (TYLENOL) tablet 650 mg  650 mg Oral Q4H PRN    Or    acetaminophen (TYLENOL) solution 650 mg  650 mg Oral Q4H PRN    Or    acetaminophen (TYLENOL) suppository 650 mg  650 mg Rectal Q4H PRN    polyethylene glycol (MIRALAX) packet 17 g  17 g Oral DAILY    docusate sodium (COLACE) capsule 100 mg  100 mg Oral BID    oxyCODONE ER (OxyCONTIN) tablet 10 mg  10 mg Oral Q12H    morphine (ROXANOL) concentrated oral syringe 10 mg  10 mg Oral Q2H PRN    LORazepam (INTENSOL) 2 mg/mL oral concentrate 0.5 mg  0.5 mg Oral Q4H PRN    morphine injection 2 mg  2 mg IntraVENous Q2H PRN    oxyCODONE IR (ROXICODONE) tablet 5 mg  5 mg Oral Q4H PRN    ondansetron (ZOFRAN) injection 4 mg  4 mg IntraVENous Q6H PRN        LAB AND IMAGING FINDINGS:     Lab Results   Component Value Date/Time    WBC 11.6 11/16/2019 08:30 PM    HGB 9.2 (L) 11/16/2019 08:30 PM    PLATELET 226 78/17/5162 08:30 PM     Lab Results   Component Value Date/Time    Sodium 135 (L) 11/16/2019 08:30 PM    Potassium 3.7 11/16/2019 08:30 PM    Chloride 97 (L) 11/16/2019 08:30 PM    CO2 28 11/16/2019 08:30 PM    BUN 13 11/16/2019 08:30 PM    Creatinine 1.03 11/16/2019 08:30 PM    Calcium 8.3 (L) 11/16/2019 08:30 PM    Magnesium 1.9 11/16/2019 08:30 PM      Lab Results   Component Value Date/Time    AST (SGOT) 150 (H) 11/16/2019 08:30 PM    Alk.  phosphatase 237 (H) 11/16/2019 08:30 PM    Protein, total 7.3 11/16/2019 08:30 PM    Albumin 2.7 (L) 11/16/2019 08:30 PM    Globulin 4.6 (H) 11/16/2019 08:30 PM     No results found for: INR, PTMR, PTP, PT1, PT2, APTT, INREXT, INREXT   No results found for: IRON, FE, TIBC, IBCT, PSAT, FERR   No results found for: PH, PCO2, PO2  No components found for: Kp Point   Lab Results   Component Value Date/Time     11/16/2019 08:30 PM    CK - MB <1.0 11/16/2019 08:30 PM              Total time: 50 minutes   Counseling / coordination time, spent as noted above: 40 minutes   > 50% counseling / coordination: yes with patient and daughter     Prolonged service was provided for  []30 min   []75 min in face to face time in the presence of the patient, spent as noted above. Time Start:   Time End:   Note: this can only be billed with 06606 (initial) or 19267 (follow up). If multiple start / stop times, list each separately.

## 2019-11-18 NOTE — PROGRESS NOTES
1930 - Received report from Dorothea wSenson RN(offgoing nurse). Assumed care of PT. PT assessed. Venous access device present left upper chest. Comfort care. 5943 - Patient pain level 8/10. Patient requested pain medication. Morphine administered. Will continue to monitor patient. Bedside and Verbal shift change report given to Humera MADDEN RN(oncoming nurse) by Lucrecia Bales RN (offgoing nurse) successfully. Report included the following information SBAR, Kardex, Procedure Summary, Intake/Output, MAR, Accordion, Recent Results and Med Rec Status.

## 2019-11-18 NOTE — PROGRESS NOTES
Assumed care of pt Cristian Mae. Pt is alert no sign of distress. Call light within reach bed in lowest position. All needs addressed. 4955: Pt c/o pain gave pain medication. Will reassess. 1003: pt c/o pain gave pain medication Will reassess. 1059: Pt c/o pain gave pain medicaiton see flow sheet. 1300: Pt c/opain gave pain medication see flow sheet. 1400: pt resting no c/o pain. 1500: pt resting no c/o pain. sheet low sheet  1741: Pt c/o pain gave pain medication see flow sheet. Pt had uneventful shift.

## 2019-11-19 VITALS
RESPIRATION RATE: 17 BRPM | DIASTOLIC BLOOD PRESSURE: 58 MMHG | WEIGHT: 80.1 LBS | HEIGHT: 64 IN | BODY MASS INDEX: 13.67 KG/M2 | OXYGEN SATURATION: 100 % | HEART RATE: 86 BPM | SYSTOLIC BLOOD PRESSURE: 114 MMHG | TEMPERATURE: 98.3 F

## 2019-11-19 PROCEDURE — 74011250637 HC RX REV CODE- 250/637: Performed by: INTERNAL MEDICINE

## 2019-11-19 PROCEDURE — 74011250636 HC RX REV CODE- 250/636

## 2019-11-19 PROCEDURE — 74011250637 HC RX REV CODE- 250/637: Performed by: FAMILY MEDICINE

## 2019-11-19 RX ORDER — HEPARIN SODIUM (PORCINE) LOCK FLUSH IV SOLN 100 UNIT/ML 100 UNIT/ML
SOLUTION INTRAVENOUS
Status: COMPLETED
Start: 2019-11-19 | End: 2019-11-19

## 2019-11-19 RX ORDER — LORAZEPAM 2 MG/ML
0.5 CONCENTRATE ORAL
Qty: 30 ML | Refills: 0 | Status: SHIPPED | OUTPATIENT
Start: 2019-11-19

## 2019-11-19 RX ORDER — OXYCODONE HYDROCHLORIDE 5 MG/1
5 TABLET ORAL
Qty: 12 TAB | Refills: 0 | Status: SHIPPED | OUTPATIENT
Start: 2019-11-19 | End: 2019-11-22

## 2019-11-19 RX ORDER — OXYCODONE HCL 10 MG/1
10 TABLET, FILM COATED, EXTENDED RELEASE ORAL EVERY 12 HOURS
Qty: 10 TAB | Refills: 0 | Status: SHIPPED | OUTPATIENT
Start: 2019-11-19 | End: 2019-11-24

## 2019-11-19 RX ORDER — NALOXONE HYDROCHLORIDE 4 MG/.1ML
SPRAY NASAL
Qty: 2 EACH | Refills: 0 | Status: SHIPPED | OUTPATIENT
Start: 2019-11-19

## 2019-11-19 RX ADMIN — MORPHINE SULFATE 10 MG: 100 SOLUTION ORAL at 08:05

## 2019-11-19 RX ADMIN — DOCUSATE SODIUM 100 MG: 100 CAPSULE, LIQUID FILLED ORAL at 10:19

## 2019-11-19 RX ADMIN — MORPHINE SULFATE 10 MG: 100 SOLUTION ORAL at 04:58

## 2019-11-19 RX ADMIN — MORPHINE SULFATE 10 MG: 100 SOLUTION ORAL at 10:29

## 2019-11-19 RX ADMIN — HEPARIN SODIUM (PORCINE) LOCK FLUSH IV SOLN 100 UNIT/ML 500 UNITS: 100 SOLUTION at 11:44

## 2019-11-19 RX ADMIN — OXYCODONE HYDROCHLORIDE 5 MG: 5 TABLET ORAL at 03:06

## 2019-11-19 NOTE — ROUTINE PROCESS
Bedside and Verbal shift change report given to YOEL Doll (oncoming nurse) by Javy Griffiths (offgoing nurse). Report included the following information SBAR, Kardex, Intake/Output and MAR.

## 2019-11-19 NOTE — PROGRESS NOTES
Patient transferred from [facility] with Colon CA, weakness, and AMS for hospice care. Patient A&O x 3-4, c/o pain to sacrum rating at 9/10, & malnourished weighing 80 lbs at 5'4\". Patient unable to remember date of last bowel movement, but stated has not been eating. Mediport to left subclavian intact. Vital signs stable. 8453 - Patient given morphine 2 mg IV for sacral pain rating 9/10 which provided temporary relief.      0306 - Patient awakened from pain to sacrum, in which Roxicodone 5 mg po given but also provided short-term relief. 7780 - Patient's pain not-well controlled with previous pain management of morphine 2 mg IV and Roxicodone 5 mg po. Patient pain level to sacrum at 10, Roxanol 10 mg soln given, which provided her longer relief. Transition to hospice care.

## 2019-11-19 NOTE — DISCHARGE SUMMARY
Discharge Summary    Patient: Leila Downs MRN: [id number]  CSN: [id number]    YOB: 1955  Age: 60 y.o. Sex: female    DOA: 11/16/2019 LOS:  LOS: 3 days   Discharge Date: 11/19/2019     Primary Care Provider:  Yenny Gonzalez MD    Admission Diagnoses: Colon cancer [facility]) [C18.9]    Discharge Diagnoses:    Hospital Problems  Date Reviewed: 11/17/2019          Codes Class Noted POA    * (Principal) Hospice care ICD-10-CM: Z51.5  ICD-9-CM: V66.7  11/17/2019 Yes        Weakness ICD-10-CM: R53.1  ICD-9-CM: 780.79  11/17/2019 Yes        Colon cancer ([facility] [address] ICD-10-CM: C18.9  ICD-9-CM: 153.9  11/16/2019 Yes              Discharge Condition: stable     Discharge Medications:     Discharge Medication List as of 11/19/2019 11:58 AM      START taking these medications    Details   naloxone (NARCAN) 4 mg/actuation nasal spray Use 1 spray intranasally, then discard. Repeat with new spray every 2 min as needed for opioid overdose symptoms, alternating nostrils. , Normal, Disp-2 Each, R-0      morphine (ROXANOL) 100 mg/5 mL (20 mg/mL) concentrated solution Take 0.5 mL by mouth every two (2) hours as needed for Pain for up to 3 days. Max Daily Amount: 120 mg., Print, Disp-30 mL, R-0      scopolamine (TRANSDERM-SCOP) 1 mg over 3 days pt3d 1 Patch by TransDERmal route every seventy-two (72) hours. , Print, Disp-1 Patch, R-0         CONTINUE these medications which have CHANGED    Details   oxyCODONE ER (OXYCONTIN) 10 mg ER tablet Take 1 Tab by mouth every twelve (12) hours for 5 days. Max Daily Amount: 20 mg., Print, Disp-10 Tab, R-0      oxyCODONE IR (ROXICODONE) 5 mg immediate release tablet Take 1 Tab by mouth every four (4) hours as needed for Pain for up to 3 days. Max Daily Amount: 30 mg., Print, Disp-12 Tab, R-0      LORazepam (INTENSOL) 2 mg/mL concentrated solution Take 0.25 mL by mouth every four (4) hours as needed for Anxiety.  Max Daily Amount: 3 mg., Print, Disp-30 mL, R-0         CONTINUE these medications which have NOT CHANGED    Details   gabapentin (NEURONTIN) 300 mg capsule Take 300 mg by mouth., Historical Med         STOP taking these medications       LORazepam (ATIVAN) 1 mg tablet Comments:   Reason for Stopping:         diazePAM (VALIUM) 2 mg tablet Comments:   Reason for Stopping:               Procedures : none     Consults: None      PHYSICAL EXAM   Visit Vitals  /58 (BP 1 Location: Right arm, BP Patient Position: At rest)   Pulse 86   Temp 98.3 °F (36.8 °C)   Resp 17   Ht 5' 4\" (1.626 m)   Wt 36.3 kg (80 lb 1.6 oz)   SpO2 100%   BMI 13.75 kg/m²     General: Awake, cooperative, no acute distress    HEENT: NC, Atraumatic. PERRLA, EOMI. Anicteric sclerae. Lungs:  CTA Bilaterally. No Wheezing/Rhonchi/Rales. Heart:  Regular  rhythm,  No murmur, No Rubs, No Gallops  Abdomen: Soft, Non distended, Non tender. +Bowel sounds,   Extremities: No c/c/e  Psych:   Not anxious or agitated. Neurologic:  No acute neurological deficits. Admission HPI :   Sarah Matthew is a 60 y.o. female who has been battling stage IV colon cancer for the past 2 years with progression of disease despite chemotherapy presents with her boyfriend and daughter for intractable pain and weakness. Her daughter reports over the past 3 weeks her mom has been getting much weaker and her boyfriend is unable to lift her around the house and she is 7-1/2 months pregnant. The patient states she is in severe pain. Her daughter states that hospice was offered but has not been set up yet    Hospital Course : Sarah Mathtew is a 60 y.o. female who has been battling stage IV colon cancer for the past 2 years with progression of disease despite chemotherapy was admitted for arrangement of hospice at home. Since she was admitted pain control was started with comfort management only. CM was on board. Hospice was consulted. Before discharge, all equipment required for hospice were delivered.   Her pain was controlled by p.o. narcotics. Discharge planning discussed with patient and family, agree with the plan and no questions and concerns at this point. Activity: Activity as tolerated    Diet: Regular Diet    Follow-up: Hospice physician    Disposition: home hospice    Minutes spent on discharge: 45 min       Labs: Results:       Chemistry Recent Labs     11/16/19 2030   GLU 94   *   K 3.7   CL 97*   CO2 28   BUN 13   CREA 1.03   CA 8.3*   AGAP 10   BUCR 13   *   TP 7.3   ALB 2.7*   GLOB 4.6*   AGRAT 0.6*      CBC w/Diff Recent Labs     11/16/19 2030   WBC 11.6   RBC 3.76*   HGB 9.2*   HCT 29.8*      GRANS 84*   LYMPH 5*   EOS 0      Cardiac Enzymes Recent Labs     11/16/19 2030      CKND1 CALCULATION NOT PERFORMED WHEN RESULT IS BELOW LINEAR LIMIT      Coagulation No results for input(s): PTP, INR, APTT, INREXT in the last 72 hours. Lipid Panel No results found for: CHOL, CHOLPOCT, CHOLX, CHLST, CHOLV, 467313, HDL, HDLP, LDL, LDLC, DLDLP, 691727, VLDLC, VLDL, TGLX, TRIGL, TRIGP, TGLPOCT, CHHD, CHHDX   BNP No results for input(s): BNPP in the last 72 hours. Liver Enzymes Recent Labs     11/16/19 2030   TP 7.3   ALB 2.7*   *   SGOT 150*      Thyroid Studies No results found for: T4, T3U, TSH, TSHEXT         Significant Diagnostic Studies: Ct Abd Pelv W Cont    Result Date: 11/16/2019  EXAM: CT of the abdomen and pelvis INDICATION: Stage IV colon cancer evaluate for obstruction bony metastasis or perforation COMPARISON: Outside CT dated August 1, 2019 TECHNIQUE: Axial CT imaging of the abdomen and pelvis was performed with intravenous contrast. Multiplanar reformats were generated. One or more dose reduction techniques were used on this CT: automated exposure control, adjustment of the mAs and/or kVp according to patient size, and iterative reconstruction techniques.   The specific techniques used on this CT exam have been documented in the patient's electronic medical record. Digital Imaging and Communications in Medicine (DICOM) format image data are available to nonaffiliated external healthcare facilities or entities on a secure, media free, reciprocally searchable basis with patient authorization for at least a 12-month period after this study. _______________ FINDINGS: LOWER CHEST: There is small right effusion. LIVER, BILIARY: There are large liver masses hepatic metastasis. Largest is seen in the right hepatic lobe measuring 13.2 x 7.2 cm. Second largest is seen in the right hepatic lobe inferiorly measuring 8.4 x 6.6 cm. Both of these masses are increased in size from prior outside CT. Other smaller metastases are seen in the liver. Common bile duct is within normal limits for patient's age at 7 mm. Gallbladder is difficult identified. PANCREAS: Normal. SPLEEN: Normal. ADRENALS: Normal. KIDNEYS: Left kidney demonstrates mild-to-moderate hydronephrosis. Right kidney demonstrates moderate to severe hydronephrosis. Right kidney demonstrates delayed nephrograms. Both ureters are dilated down to the pelvis and are likely being obstructed by the colonic mass. Urology consultation advised. VASCULATURE: Unremarkable LYMPH NODES: There are enlarged kelsey hepatis lymph nodes measuring up to 17 mm. GASTROINTESTINAL TRACT: There is extensive circumferential soft tissue thickening about the rectosigmoid colon likely representing the patient's colonic malignancy. The margins of this mass are difficult to distinguish surrounding soft tissue structures. There is loss of fat plane. PELVIC ORGANS: Calcified fibroid present in the uterus. Urinary bladder is under distended therefore difficult to evaluate. BONES: No acute or aggressive osseous abnormalities identified. OTHER: None. _______________     IMPRESSION: Overall there is worsening of patient's disease with enlarging liver metastases.  There is new bilateral hydronephrosis and hydroureter right greater than left with delayed nephrograms on the right. Suspect the ureters are being obstructed by by the colonic malignancy in the pelvis. Enlarged kelsey hepatis lymph nodes. Again seen colonic malignancy of the rectosigmoid colon. Large amount of stool is present in the colon. Findings are discussed with Dr. Shadia Sahni approximately 10:00 PM November 16, 2019.             [facility]     CC: Noble Gonzalez MD

## 2019-11-19 NOTE — PROGRESS NOTES
Problem: Falls - Risk of  Goal: *Absence of Falls  Description  Document [facility] Fall Risk and appropriate interventions in the flowsheet.   Outcome: Progressing Towards Goal  Note:   Fall Risk Interventions:  Mobility Interventions: PT Consult for mobility concerns         Medication Interventions: Patient to call before getting OOB    Elimination Interventions: Call light in reach, Patient to call for help with toileting needs

## 2019-11-19 NOTE — PROGRESS NOTES
1906 Assumed patient care at this time. Report received from Christine Velasco RN. Patient in bed in lowest position with wheels locked. Call light within reach. 1950 Incontinence care provided. 1954 Shift assessment completed and documented in flow sheets at this time. PRN morphine administered. 2253 TRANSFER - OUT REPORT:    Verbal report given to Loan Juares RN (name) on Shaina Woodward  being transferred to [place] [id number] (unit) for routine progression of care       Report consisted of patients Situation, Background, Assessment and   Recommendations(SBAR). Information from the following report(s) SBAR, Kardex, Intake/Output, MAR, Recent Results and Quality Measures was reviewed with the receiving nurse. Opportunity for questions and clarification was provided. Patient transported with:   Registered Nurse     0329 Patient transferred to [place] [address]. Room set up. Patient provided with call light.

## 2019-11-19 NOTE — ROUTINE PROCESS
Bedside and Verbal shift change report given to Cornel Ahmadi (oncoming nurse) by Estuardo Giraldo (offgoing nurse). Report included the following information SBAR, Kardex and MAR. Pt transferred from tele to unit after shift change report given to oncoming nurse Cornel Ahmadi.

## 2019-11-19 NOTE — ROUTINE PROCESS
Bedside shift change report given to [facility] (oncoming nurse) by Maggi Guido RN (offgoing nurse). Report included the following information SBAR, Kardex, Intake/Output and MAR.

## 2019-11-19 NOTE — PROGRESS NOTES
D/C Plan: [facility] 11/19/19    Noted pt transfer to [address] [place] [address]. CM spoke with Carly Friday from hospice [phone] from [address] [place] [address] and contacted pt's daughter, Riya Ibarra ([phone]) and she is aware of plan transition home today with [address] [place] [address]. CM verified pt's address. Medical transport has been arranged for today to pt's home. No other transition of care needs have been identified. Care Management Interventions  PCP Verified by CM: Yes  Mode of Transport at Discharge: BLS  Transition of Care Consult (CM Consult): [place]: No  Reason Outside [place]: Patient already serviced by other home care/hospice agency  Health Maintenance Reviewed: Yes  Current Support Network:  Other, Family Lives Nearby  Confirm Follow Up Transport: Family  Plan discussed with Pt/Family/Caregiver: Yes  Freedom of Choice Offered: Yes  Discharge Location  Discharge Placement: Home with hospice([facility])

## 2019-11-19 NOTE — PROGRESS NOTES
3200 -  Bedside and verbal shift change report given to Marlena Boudreaux RN (on coming nurse) by Isaac Snyder RN (off going nurse). Report included the following information SBAR, Kardex, OR Summary, Intake/Output and MAR.    1144 -  Gonsalez needle from med port on pt's left chest removed by Judson Mccarthy RN. Heparinized (Heparin Lock flush, 500 USP units 5 mL)  before needle removal.  Transported by [facility] transporter to personal residence.

## 2019-11-19 NOTE — PROGRESS NOTES
[facility]: [phone]  [facility]: [address] [place] [address] Way: [phone]    Patient Name: Jonathan Meraz  YOB: 1955    Date of Initial Consult: 11/18/2019, follow up 11/19/2019    Reason for Consult: care decisions   Requesting Provider: Karyn Howe MD  Primary Care Physician: Chela Silva MD      SUMMARY:   Jonathan Meraz is a 60y.o. year old with a past history of metastatic colon cancer,stage IV , who was admitted on 11/16/2019 from home  with a diagnosis of intractable pain. Current medical issues leading to Palliative Medicine involvement include: 60year old female who met colon cancer, no longer desires chemotherapy. She presented to the ER with intractable abdominal pain and rectal pain. Patient and family are interested in home hospice services when pain is better controlled. Palliative medicine is consulted for support and care decisions. 11/19/2019 Ms Rommel Coreas is lying in bed, alert, weak still with some rectal pressure last received Roxanol at 0400. RN aware and will bring pain medications. Home today with hospice. POST completed for DNR/DNI comfort measures no feeding tubes. PALLIATIVE DIAGNOSES:   1. Advanced care plan discussion   2. Colon cancer   3. Abdominal/ rectal pain   4. Debility        PLAN:   1. 11/19/2019 follow up on Ms Rommel Coreas who is on comfort measures. Lying in bed this am. Eyes open alerts and answers questions. Weak frail appearing. Home with hospice today. POST on chart for DNR/DNI, comfort measures, no feeding tubes. 2.   Abdominal pain / rectal pain still with rectal pressure. Roxanol 10 mg q 2 hr PRN, received total of 50 mg in past 24 hours. Along with 6 mg of IV Morphine. Total MME = 68 mg of morphine. Discussed symptom management with patient and daughter yesterday. Hospice will monitor and treat when she gets home.      ( please see below for previous discussions)     2. Advanced care plan discussion Seen along with Ms Bella Elizabeth is alert and oriented x 4. Very weak and fatigued, but can participate in conversation with her daughter's assistance. Julien Harvey has not completed an AMD. Today she did not feel well enough to complete. Social : lives with her boyfriend, not , has one daughter Sia. Daughter Tj Krause is legal next of kin and was present for discussions. Patient would like to return home with hospice services and have already decided to use [facility] ( they have used them for Home Health). Goals of care affirmed DNR/DNI. POST form introduced and discussed. Patient was able to participate in conversation by nodding. POST completed for DNR/DNI. Comfort measures, no feeding tubes. Hospice referral has been placed . Plan home tomorrow with hospice services. Support offered to Davie Latif during this difficult time. 3. Colon cancer with mets has been followed by Dr. Mayra Martinez of [facility]. 15. No further chemotherapy. Home with hospice tomorrow  4. Abd pain / rectal pain. Roxanol 10 mg q 2 hrs PRN on board. Finds Roxanol more effective then Morphine IV. Recommend sending home with RX of Roxanol 10 to 20 mg q 1 to 2 hrs PRN pain. 5. Debility weak, very thin. Alb 2.7. Needs assistance with ADL's. Appetite poor. Discussed with Sia, allow Julien Harvey to eat and drink what is pleasurable. 6. Initial consult note routed to primary continuity provider  7.  Communicated plan of care with: Palliative IDT, patient, daughter, CM    GOALS OF CARE: home with hospice   Patient/Health Care Proxy Stated Goals: Comfort      TREATMENT PREFERENCES:   Code Status: DNR    Advance Care Planning:  [] The [facility] Interdisciplinary Team has updated the ACP Navigator with [address] and Patient Capacity    Primary Decision Maker ([address]): legal next of kin is charleen Stovall     Medical Interventions: Comfort measures     Artificially Administered Nutrition: No feeding tube     Other:  As far as possible, the palliative care team has discussed with patient / health care proxy about goals of care / treatment preferences for patient. HISTORY:     History obtained from: chart, daughter     CHIEF COMPLAINT: abdominal / rectal pain     HPI/SUBJECTIVE:    The patient is:   [x] Verbal and participatory somewhat limited due to weakness   [] Non-participatory due to:   Please see summary     Clinical Pain Assessment (nonverbal scale for nonverbal patients): Clinical Pain Assessment  Severity: 4  Location: rectal area   Character: pressure   Duration: at times constant   Effect: can't rest   Factors: met colon cancer   Frequency: every hour or so          Duration: for how long has pt been experiencing pain (e.g., 2 days, 1 month, years)  Frequency: how often pain is an issue (e.g., several times per day, once every few days, constant)     FUNCTIONAL ASSESSMENT:     Palliative Performance Scale (PPS):  PPS: 30    ECOG  ECOG Status : Completely disabled     PSYCHOSOCIAL/SPIRITUAL SCREENING:      Any spiritual / Druze concerns:  [] Yes /  [x] No    Caregiver Burnout:  [] Yes /  [x] No /  [] No Caregiver Present      Anticipatory grief assessment:   [x] Normal  / [] Maladaptive        REVIEW OF SYSTEMS:     Positive and pertinent negative findings in ROS are noted above in HPI. The following systems were [x] reviewed / [] unable to be reviewed as noted in HPI  Other findings are noted below. Systems: constitutional, ears/nose/mouth/throat, respiratory, gastrointestinal, genitourinary, musculoskeletal, integumentary, neurologic, psychiatric, endocrine. Positive findings noted below.   Modified ESAS Completed by: provider   Fatigue: 8 Drowsiness: 0     Pain: 4   Anxiety: 0 Nausea: 0   Anorexia: 8 Dyspnea: 0                    PHYSICAL EXAM:     Wt Readings from Last 3 Encounters:   11/18/19 36.3 kg (80 lb 1.6 oz)   01/19/18 40.8 kg (90 lb)     Blood pressure 114/58, pulse 86, temperature 98.3 °F (36.8 °C), resp. rate 17, height 5' 4\" (1.626 m), weight 36.3 kg (80 lb 1.6 oz), SpO2 100 %. Pain:  Pain Scale 1: Numeric (0 - 10)  Pain Intensity 1: 0     Pain Location 1: Back, Buttocks  Pain Orientation 1: Posterior  Pain Description 1: Aching  Pain Intervention(s) 1: Medication (see MAR), Repositioned  Last bowel movement: none recorded     Constitutional: frail, weak terminal appearing female who is lying in bed alert in NAD  Eyes: pupils equal, anicteric  Respiratory: breathing not labored  Musculoskeletal: thin extremities, temporal wasting   Skin: warm, dry  Neurologic: following commands, moving all extremities, alert oriented x 4 voice weak          HISTORY:     Principal Problem:    Hospice care (11/17/2019)    Active Problems:    Colon cancer ([facility] [address] (11/16/2019)      Weakness (11/17/2019)      Past Medical History:   Diagnosis Date    Cancer [facility])     colon cancer      History reviewed. No pertinent surgical history. History reviewed. No pertinent family history. History reviewed, no pertinent family history.   Social History     Tobacco Use    Smoking status: Never Smoker    Smokeless tobacco: Never Used   Substance Use Topics    Alcohol use: Not Currently     No Known Allergies   Current Facility-Administered Medications   Medication Dose Route Frequency    scopolamine (TRANSDERM-SCOP) 1 mg over 3 days 1 Patch  1 Patch TransDERmal Q72H    LORazepam (ATIVAN) injection 1 mg  1 mg IntraVENous Q15MIN PRN    acetaminophen (TYLENOL) tablet 650 mg  650 mg Oral Q4H PRN    Or    acetaminophen (TYLENOL) solution 650 mg  650 mg Oral Q4H PRN    Or    acetaminophen (TYLENOL) suppository 650 mg  650 mg Rectal Q4H PRN    polyethylene glycol (MIRALAX) packet 17 g  17 g Oral DAILY    docusate sodium (COLACE) capsule 100 mg  100 mg Oral BID    oxyCODONE ER (OxyCONTIN) tablet 10 mg  10 mg Oral Q12H    morphine (ROXANOL) concentrated oral syringe 10 mg  10 mg Oral Q2H PRN    LORazepam (INTENSOL) 2 mg/mL oral concentrate 0.5 mg  0.5 mg Oral Q4H PRN    morphine injection 2 mg  2 mg IntraVENous Q2H PRN    oxyCODONE IR (ROXICODONE) tablet 5 mg  5 mg Oral Q4H PRN    ondansetron (ZOFRAN) injection 4 mg  4 mg IntraVENous Q6H PRN        LAB AND IMAGING FINDINGS:     Lab Results   Component Value Date/Time    WBC 11.6 11/16/2019 08:30 PM    HGB 9.2 (L) 11/16/2019 08:30 PM    PLATELET 685 77/60/4782 08:30 PM     Lab Results   Component Value Date/Time    Sodium 135 (L) 11/16/2019 08:30 PM    Potassium 3.7 11/16/2019 08:30 PM    Chloride 97 (L) 11/16/2019 08:30 PM    CO2 28 11/16/2019 08:30 PM    BUN 13 11/16/2019 08:30 PM    Creatinine 1.03 11/16/2019 08:30 PM    Calcium 8.3 (L) 11/16/2019 08:30 PM    Magnesium 1.9 11/16/2019 08:30 PM      Lab Results   Component Value Date/Time    AST (SGOT) 150 (H) 11/16/2019 08:30 PM    Alk. phosphatase 237 (H) 11/16/2019 08:30 PM    Protein, total 7.3 11/16/2019 08:30 PM    Albumin 2.7 (L) 11/16/2019 08:30 PM    Globulin 4.6 (H) 11/16/2019 08:30 PM     No results found for: INR, PTMR, PTP, PT1, PT2, APTT, INREXT, INREXT   No results found for: IRON, FE, TIBC, IBCT, PSAT, FERR   No results found for: PH, PCO2, PO2  No components found for: Kp Point   Lab Results   Component Value Date/Time     11/16/2019 08:30 PM    CK - MB <1.0 11/16/2019 08:30 PM              Total time: 15 minutes   Counseling / coordination time, spent as noted above: 10 minutes   > 50% counseling / coordination: yes with patient and      Prolonged service was provided for  []30 min   []75 min in face to face time in the presence of the patient, spent as noted above. Time Start:   Time End:   Note: this can only be billed with 72747 (initial) or 15136 (follow up). If multiple start / stop times, list each separately.

## 2019-11-19 NOTE — PROGRESS NOTES
Occupational Therapy Screening:  Services are not indicated at this time. [facility] for [facility] therapy based on results obtained during the nursing admission assessment. The patients chart was reviewed and the patient is not appropriate for a skilled therapy evaluation at this time. Patient was admitted with weakness d/t colon CA and going home w/ Hospice. Thank you.   Shyla Viera, OTR/L, CSRS